# Patient Record
Sex: MALE | Race: BLACK OR AFRICAN AMERICAN | NOT HISPANIC OR LATINO | Employment: FULL TIME | URBAN - METROPOLITAN AREA
[De-identification: names, ages, dates, MRNs, and addresses within clinical notes are randomized per-mention and may not be internally consistent; named-entity substitution may affect disease eponyms.]

---

## 2019-06-03 ENCOUNTER — HOSPITAL ENCOUNTER (EMERGENCY)
Facility: HOSPITAL | Age: 29
Discharge: HOME | End: 2019-06-04
Attending: EMERGENCY MEDICINE
Payer: COMMERCIAL

## 2019-06-03 VITALS
SYSTOLIC BLOOD PRESSURE: 147 MMHG | TEMPERATURE: 99 F | HEART RATE: 71 BPM | HEIGHT: 65 IN | RESPIRATION RATE: 20 BRPM | DIASTOLIC BLOOD PRESSURE: 96 MMHG | WEIGHT: 220 LBS | BODY MASS INDEX: 36.65 KG/M2 | OXYGEN SATURATION: 95 %

## 2019-06-03 DIAGNOSIS — K02.9 DENTAL CARIES: Primary | ICD-10-CM

## 2019-06-03 PROCEDURE — 99284 EMERGENCY DEPT VISIT MOD MDM: CPT

## 2019-06-03 RX ORDER — NAPROXEN 500 MG/1
500 TABLET ORAL 2 TIMES DAILY WITH MEALS
Qty: 30 TABLET | Refills: 0 | Status: SHIPPED | OUTPATIENT
Start: 2019-06-03 | End: 2023-06-13

## 2019-06-03 RX ORDER — CLINDAMYCIN HYDROCHLORIDE 150 MG/1
300 CAPSULE ORAL 4 TIMES DAILY
Qty: 80 CAPSULE | Refills: 0 | Status: SHIPPED | OUTPATIENT
Start: 2019-06-03 | End: 2019-06-13

## 2019-06-04 NOTE — ED PROVIDER NOTES
Encounter Date: 6/3/2019       History     Chief Complaint   Patient presents with    Dental Pain     c/o pain to right upper teeth that spreads to entire face and head x30 minutes. Took ibuprofen 20-30 minutes pta     28-year-old male coming in today complaining of some right upper posterior dental pain.  Current pain is a 7/10.  He states he is trying to sleep tonight when the pain came on.  Did not take any medications prior to arrival here.  States the pain radiates to his right face.  No trouble eating.  No nausea vomiting. No chest pain no shortness of breath.  No fevers no chills. Denies any facial swelling.  States he had been seen by dentist previously and told that he needed to have I a lot of dental work done.  The patient here denies any other acute modifying factors at this time.        Review of patient's allergies indicates:  No Known Allergies  History reviewed. No pertinent past medical history.  History reviewed. No pertinent surgical history.  No family history on file.  Social History     Tobacco Use    Smoking status: Current Every Day Smoker     Types: Cigarettes   Substance Use Topics    Alcohol use: Yes     Comment: occasionally    Drug use: No     Review of Systems   Constitutional:        ----I have reviewed the PMHx, PSHx, FamHX, social hx, allergies and other nursing notes and agree with the nursing notes.         Constitutional: Negative for fever, negative for chills, negative for weight loss    Eyes: Negative for visual changes, negative for eye pain, negative for discharge.    ENMT: Negative for hearing changes, negative for ear pain, negative for ear discharge. negative for neck pain, negative for neck stiffness. negative for epistaxis, negative for rhinorrhea. Mouth:  See HPI    Cardiac: Negative for chest pain, negative for palpitations, negative for lower extremity edema.     Respiratory: Negative for cough, negative for shortness of breath.    GI: Negative for nausea,  negative for vomiting, negative for diarrhea, negative for abdominal pain. negative for rectal bleeding.    : Negative for dysuria, negative for frequency, negative for hematuria.    MS: Negative for muscle weakness, negative for joint pain, negative for back pain.    Neuro: Negative for headache, negative for focal weakness. negative for LOC. Denies any trauma.    Skin: Negative for skin rash.     Endocrine: Negative for polyuria, negative for polydypsia.         Physical Exam     Initial Vitals [06/03/19 2234]   BP Pulse Resp Temp SpO2   (!) 147/96 71 20 98.7 °F (37.1 °C) 95 %      MAP       --         Physical Exam    Constitutional:   Constitutional: A+Ox4, NAD    Eyes: PERRLA, EOMI, Negative for scleral erythema, Negative for eye discharge, Negative for periorbital swelling.    ENT: Tympanic membranes are normal in appearance without erythema bilaterally, Negative for external canal exudate and bleeding, Pharynx: Negative for erythema; no exudate noted; Negative for rhinorrhea. Mouth: There is no floor of the mouth swelling. There is no signs of any acute gum abscess or swelling. There is no tonsillar swelling past the midline. There is no uvula deviation. Submandibular space is soft.  Multiple dental caries      Neck: Negative for neck stiffness, Negative for signs of meningismus.    Heart: RRR without M/G/R    Lungs: CTA-B, Negative for wheezes, stridor, or respiratory distress.    Abdomen: Soft, Bowel sounds x4, Negative for distension, Negative for tenderness in all 4 quadrants, Negative for guarding, Negative for rebound, Negative for rigidity, NOT a surgical acute abdomen.    Back: No costovertebral angle tenderness noted.    Skin: Warm, dry, intact.     MS: Full ROM in all ext.    Psych: Mood appropriate.    Neuro: 5/5 strength in all extremities. 2+ distal pulses and 2+ sensation throughout all extremities. Negative for slurred speech, Negative for facial droop. Negative for any focal neuro deficits.  Gait: Walks with a steady gait.          ----Parts of this note were created using DoYouRemember voice recognition software program. Significant efforts were made to correct any mistakes made by this voice recognition software program however some mistakes, errors, or omissions may remain in the note that were not caught when the note was originally created.         ED Course   Procedures  Labs Reviewed - No data to display       Imaging Results    None          Medical Decision Making:   ED Management:  Patient here is nontoxic well appearing.  Appears to have multiple dental caries.  I will start him on some antibiotics and instructed him to make sure he follows up closely with a dentist.  The patient here otherwise looks well, nontoxic well appearing as result I do feel comfortable discharging him home.  Patient was instructed to return to the ER for any worsening condition or any other emergent concerns                      Clinical Impression:       ICD-10-CM ICD-9-CM   1. Dental caries K02.9 521.00         Disposition:   Disposition: Discharged  Condition: Stable                        Jax Giles MD  06/03/19 6420

## 2019-06-04 NOTE — ED TRIAGE NOTES
Patient comes into ED with upper R dental pain that spread to face x30 minutes. Pt stated he took ibuprofen at home with no relief.

## 2019-09-16 ENCOUNTER — OCCUPATIONAL HEALTH (OUTPATIENT)
Dept: URGENT CARE | Facility: CLINIC | Age: 29
End: 2019-09-16

## 2019-09-16 DIAGNOSIS — Z02.1 PRE-EMPLOYMENT EXAMINATION: Primary | ICD-10-CM

## 2019-09-16 PROCEDURE — 99499 DOT PHYSICAL: ICD-10-PCS | Mod: S$GLB,,, | Performed by: NURSE PRACTITIONER

## 2019-09-16 PROCEDURE — 99499 UNLISTED E&M SERVICE: CPT | Mod: S$GLB,,, | Performed by: NURSE PRACTITIONER

## 2021-09-17 ENCOUNTER — OCCUPATIONAL HEALTH (OUTPATIENT)
Dept: URGENT CARE | Facility: CLINIC | Age: 31
End: 2021-09-17

## 2021-09-17 DIAGNOSIS — Z02.89 ENCOUNTER FOR EXAMINATION REQUIRED BY DEPARTMENT OF TRANSPORTATION (DOT): Primary | ICD-10-CM

## 2021-09-17 PROCEDURE — 99499 PHYSICAL, RECERT DOT/CDL: ICD-10-PCS | Mod: S$GLB,,, | Performed by: NURSE PRACTITIONER

## 2021-09-17 PROCEDURE — 99499 UNLISTED E&M SERVICE: CPT | Mod: S$GLB,,, | Performed by: NURSE PRACTITIONER

## 2023-02-02 ENCOUNTER — OFFICE VISIT (OUTPATIENT)
Dept: PODIATRY | Facility: CLINIC | Age: 33
End: 2023-02-02
Payer: COMMERCIAL

## 2023-02-02 VITALS
HEIGHT: 65 IN | DIASTOLIC BLOOD PRESSURE: 73 MMHG | SYSTOLIC BLOOD PRESSURE: 119 MMHG | BODY MASS INDEX: 36.61 KG/M2 | HEART RATE: 97 BPM

## 2023-02-02 DIAGNOSIS — B07.0 PLANTAR WART: Primary | ICD-10-CM

## 2023-02-02 DIAGNOSIS — L60.9 DISEASE OF NAIL: ICD-10-CM

## 2023-02-02 DIAGNOSIS — L60.0 INGROWN NAIL: ICD-10-CM

## 2023-02-02 PROCEDURE — 3078F PR MOST RECENT DIASTOLIC BLOOD PRESSURE < 80 MM HG: ICD-10-PCS | Mod: CPTII,S$GLB,, | Performed by: STUDENT IN AN ORGANIZED HEALTH CARE EDUCATION/TRAINING PROGRAM

## 2023-02-02 PROCEDURE — 99203 OFFICE O/P NEW LOW 30 MIN: CPT | Mod: S$GLB,,, | Performed by: STUDENT IN AN ORGANIZED HEALTH CARE EDUCATION/TRAINING PROGRAM

## 2023-02-02 PROCEDURE — 99203 PR OFFICE/OUTPT VISIT, NEW, LEVL III, 30-44 MIN: ICD-10-PCS | Mod: S$GLB,,, | Performed by: STUDENT IN AN ORGANIZED HEALTH CARE EDUCATION/TRAINING PROGRAM

## 2023-02-02 PROCEDURE — 1159F PR MEDICATION LIST DOCUMENTED IN MEDICAL RECORD: ICD-10-PCS | Mod: CPTII,S$GLB,, | Performed by: STUDENT IN AN ORGANIZED HEALTH CARE EDUCATION/TRAINING PROGRAM

## 2023-02-02 PROCEDURE — 99999 PR PBB SHADOW E&M-EST. PATIENT-LVL III: CPT | Mod: PBBFAC,,, | Performed by: STUDENT IN AN ORGANIZED HEALTH CARE EDUCATION/TRAINING PROGRAM

## 2023-02-02 PROCEDURE — 1159F MED LIST DOCD IN RCRD: CPT | Mod: CPTII,S$GLB,, | Performed by: STUDENT IN AN ORGANIZED HEALTH CARE EDUCATION/TRAINING PROGRAM

## 2023-02-02 PROCEDURE — 3008F BODY MASS INDEX DOCD: CPT | Mod: CPTII,S$GLB,, | Performed by: STUDENT IN AN ORGANIZED HEALTH CARE EDUCATION/TRAINING PROGRAM

## 2023-02-02 PROCEDURE — 3074F SYST BP LT 130 MM HG: CPT | Mod: CPTII,S$GLB,, | Performed by: STUDENT IN AN ORGANIZED HEALTH CARE EDUCATION/TRAINING PROGRAM

## 2023-02-02 PROCEDURE — 99999 PR PBB SHADOW E&M-EST. PATIENT-LVL III: ICD-10-PCS | Mod: PBBFAC,,, | Performed by: STUDENT IN AN ORGANIZED HEALTH CARE EDUCATION/TRAINING PROGRAM

## 2023-02-02 PROCEDURE — 3078F DIAST BP <80 MM HG: CPT | Mod: CPTII,S$GLB,, | Performed by: STUDENT IN AN ORGANIZED HEALTH CARE EDUCATION/TRAINING PROGRAM

## 2023-02-02 PROCEDURE — 3074F PR MOST RECENT SYSTOLIC BLOOD PRESSURE < 130 MM HG: ICD-10-PCS | Mod: CPTII,S$GLB,, | Performed by: STUDENT IN AN ORGANIZED HEALTH CARE EDUCATION/TRAINING PROGRAM

## 2023-02-02 PROCEDURE — 3008F PR BODY MASS INDEX (BMI) DOCUMENTED: ICD-10-PCS | Mod: CPTII,S$GLB,, | Performed by: STUDENT IN AN ORGANIZED HEALTH CARE EDUCATION/TRAINING PROGRAM

## 2023-02-02 NOTE — PROGRESS NOTES
Subjective:      Patient ID: Yves Yanez is a 32 y.o. male.    Chief Complaint: Foot Problem (Corns/ callouses, or plantar warts) and Toe Pain (Left great toe, painful not all the time, periodically )    Yves is a 32 y.o. male who presents to the podiatry clinic  with complaint of  right foot pain. Onset of the symptoms was several months ago. Precipitating event: none known. Current symptoms include:  pain to bottom of right foot. States he also has some pain to his left great toe. He is also concerned that his toenails are thick . Relates his feet stay wet as he works outside. Aggravating factors: any weight bearing. Symptoms have gradually worsened. Patient has had prior foot problems. Evaluation to date: none. Treatment to date: none. Patients rates pain 6/10 on pain scale.    Review of Systems   Constitutional: Negative for chills, decreased appetite, diaphoresis and fever.   HENT:  Negative for congestion and hearing loss.    Cardiovascular:  Negative for chest pain, claudication, leg swelling and syncope.   Respiratory:  Negative for cough and shortness of breath.    Skin:  Positive for dry skin, nail changes and suspicious lesions. Negative for color change, flushing, itching, poor wound healing and rash.   Musculoskeletal:  Negative for arthritis, back pain, joint pain and joint swelling.   Gastrointestinal:  Negative for nausea and vomiting.   Neurological:  Negative for focal weakness, numbness, paresthesias and weakness.   Psychiatric/Behavioral:  Negative for altered mental status. The patient is not nervous/anxious.          Objective:      Physical Exam  Constitutional:       General: He is not in acute distress.     Appearance: Normal appearance. He is well-developed. He is not diaphoretic.   Cardiovascular:      Comments: Dorsalis pedis and posterior tibial pulses are within normal limits. Skin temperature is within normal limits. Toes are cool to touch and feet are warm proximally. Hair growth is  within normal limits. Skin is normotrophic and without hyperpigmentation. No edema noted. No varicosities or spider veins noted, bilaterally.   Musculoskeletal:      Comments: Adequate joint range of motion without pain, limitation, nor crepitation to foot and ankle joints. Muscle strength is 5/5 in all groups bilaterally.       Feet:      Right foot:      Skin integrity: Dry skin present. No ulcer, blister, erythema or warmth.      Left foot:      Skin integrity: Dry skin present. No ulcer, blister, erythema or warmth.   Skin:     General: Skin is warm and dry.      Capillary Refill: Capillary refill takes less than 2 seconds.      Comments: Skin is warm and dry, no acute signs of infection noted bilaterally      Callus to right plantar foot, upon debridement, site with loss of RSTLs, pin point bleeding and spongy core, consistent with verruca plantaris    Left hallux toenail with distal cryptotic nail border, mild tenderness on palpation, no open wounds or signs of infection. All toenails are thickened by 2-4 mm's, dystrophic, and are darkened in coloration with subungual fungal debris.     Otherwise, no open wounds, macerations or hyperkeratotic lesions, bilaterally            Neurological:      Mental Status: He is alert and oriented to person, place, and time.      Sensory: No sensory deficit.      Motor: No abnormal muscle tone.      Comments: Light touch within normal limits.    Psychiatric:         Mood and Affect: Mood normal.         Behavior: Behavior normal.         Thought Content: Thought content normal.             Assessment:       Encounter Diagnoses   Name Primary?    Plantar wart Yes    Disease of nail     Ingrown nail          Plan:       Yves was seen today for foot problem and toe pain.    Diagnoses and all orders for this visit:    Plantar wart    Disease of nail    Ingrown nail      I counseled the patient on his conditions, their implications and medical management.  Utilizing sterile  toenail clippers I aggressively trimmed  the offending above mentioned  nail border approximately 3 mm from its edge and carried the nail plate incision down at an angle in order to wedge out the offending cryptotic portion of the nail plate. The offending border was then removed in toto. No blood was drawn. Patient tolerated the procedure well and related significant relief. Consider total nail avulsion if needed in future  Using a sterile 15 blade, overlying hyperkeratosis debrided without incident. Discussed etiology and treatment of plantar wart, including OTC treatments with list dispensed. Pt elects to try OTC Compound W, call clinic if signs of infection are noted.   Discussed importance of keeping feet dry and changing socks and shoes on a regular basis to prevent spread of toenail fungus. Discussed treatment options for fungal toenails including topical home remedies, topical over the counter and prescription medications. He elects for OTC treatment for now.  I advised him on keeping nails neatly trimmed, removing all sharp and loose edges, filing the nail as necessary to prevent damage to nail plate and prevent unnecessary pulling of toenail. Also advised to avoid tight fitting shoes to prevent pressure related issues. Recommend shoes with wide toe box or open toed shoe to reduce pressure.   Return to clinic PRN

## 2023-03-03 ENCOUNTER — OFFICE VISIT (OUTPATIENT)
Dept: OPHTHALMOLOGY | Facility: CLINIC | Age: 33
End: 2023-03-03
Payer: COMMERCIAL

## 2023-03-03 DIAGNOSIS — H18.603 KERATOCONUS OF BOTH EYES: Primary | ICD-10-CM

## 2023-03-03 PROCEDURE — 99999 PR PBB SHADOW E&M-EST. PATIENT-LVL I: CPT | Mod: PBBFAC,,, | Performed by: OPHTHALMOLOGY

## 2023-03-03 PROCEDURE — 92025 CPTRIZED CORNEAL TOPOGRAPHY: CPT | Mod: S$GLB,,, | Performed by: OPHTHALMOLOGY

## 2023-03-03 PROCEDURE — 99999 PR PBB SHADOW E&M-EST. PATIENT-LVL I: ICD-10-PCS | Mod: PBBFAC,,, | Performed by: OPHTHALMOLOGY

## 2023-03-03 PROCEDURE — 92025 COMPUTERIZED CORNEAL TOPOGRAPHY: ICD-10-PCS | Mod: S$GLB,,, | Performed by: OPHTHALMOLOGY

## 2023-03-03 PROCEDURE — 99204 OFFICE O/P NEW MOD 45 MIN: CPT | Mod: S$GLB,,, | Performed by: OPHTHALMOLOGY

## 2023-03-03 PROCEDURE — 99204 PR OFFICE/OUTPT VISIT, NEW, LEVL IV, 45-59 MIN: ICD-10-PCS | Mod: S$GLB,,, | Performed by: OPHTHALMOLOGY

## 2023-03-03 RX ORDER — FLUOXETINE HYDROCHLORIDE 20 MG/1
20 CAPSULE ORAL DAILY
COMMUNITY

## 2023-03-06 NOTE — PROGRESS NOTES
HPI    Ref Dr. Mclean    Keratoconus OU - dx'ed 27 yo    Gtts:    Pt is here today for a keratoconus eval. Pt is inquiring about cross   linking. Pt denies eye pain. Pt states the his feels like his vision has   changed and is more blurry. Pt also that's the the left eye becomes blurry   often, feels dry, and irritated. Pt was fitted for new lenses recently and   is waiting on trials.     Last edited by Radha Bustos MD on 3/3/2023  3:46 PM.            Assessment /Plan     For exam results, see Encounter Report.    Keratoconus of both eyes  -     Computerized corneal topography      KERATOCONUS      The diagnosis of corneal ectasia and its etiology and clinical course were discussed. Treatment with the use of specialty contact lenses, collagen cross linking, and corneal transplantation was explained in detail. This patient has never had LASIK.     This patient would be a suitable candidate for Collagen Cross linking (CXL) -- the goal of treatment is to strengthen corneal bonds and prevent further progression / ectasia.  VA improvement - BCVA and VAsc - not guaranteed, however is seen in some cases. Most likely would recommend speciality CL fitting post treatment for BCVA.     This patient exhibits signs of progression of keratoconus and failure of conservative treatments with spectacles and/or contact lenses.      Disease progression is indicated by:  - An increase of >1D in the Kmax  - An increase of >1D of astigmatism in the MRx  - An increase in myopia of >0.50D on MRx     I recommend treatment with Collagen Crosslinking using the Avedro FDA approved epi-off protocol with Photrexa and the KXL System, in order to stabilize this condition.     Plan:   CXL treatment OS, then OD    I have informed the patient that we will seek insurance pre-approval, but in case of failure of the insurance company to cover the cost of this medically necessary procedure, there may be a cost of $5,000 for the patient.    NO  RUBBING EYES!

## 2023-05-16 ENCOUNTER — TELEPHONE (OUTPATIENT)
Dept: OPHTHALMOLOGY | Facility: CLINIC | Age: 33
End: 2023-05-16
Payer: COMMERCIAL

## 2023-05-16 NOTE — TELEPHONE ENCOUNTER
----- Message from Kayley Rowe sent at 5/16/2023  1:26 PM CDT -----    ----- Message -----  From: Zaheer Lacy  Sent: 5/16/2023  12:30 PM CDT  To: Jul[facility] Staff    Dr. Weiss would like to speak with Dr. Bustos about a procedure that is being done. Please call back as soon as you can to further assist. # [phone]

## 2023-05-18 ENCOUNTER — TELEPHONE (OUTPATIENT)
Dept: OPHTHALMOLOGY | Facility: CLINIC | Age: 33
End: 2023-05-18
Payer: COMMERCIAL

## 2023-05-22 DIAGNOSIS — H18.603 KERATOCONUS OF BOTH EYES: Primary | ICD-10-CM

## 2023-05-22 RX ORDER — PREDNISOLONE ACETATE 10 MG/ML
1 SUSPENSION/ DROPS OPHTHALMIC 3 TIMES DAILY
Qty: 5 ML | Refills: 3 | Status: SHIPPED | OUTPATIENT
Start: 2023-05-22 | End: 2024-05-21

## 2023-05-22 RX ORDER — OFLOXACIN 3 MG/ML
1 SOLUTION/ DROPS OPHTHALMIC 3 TIMES DAILY
Qty: 5 ML | Refills: 2 | Status: SHIPPED | OUTPATIENT
Start: 2023-05-22 | End: 2024-01-18

## 2023-05-23 ENCOUNTER — TELEPHONE (OUTPATIENT)
Dept: OPTOMETRY | Facility: CLINIC | Age: 33
End: 2023-05-23
Payer: COMMERCIAL

## 2023-05-23 ENCOUNTER — TELEPHONE (OUTPATIENT)
Dept: OPHTHALMOLOGY | Facility: CLINIC | Age: 33
End: 2023-05-23
Payer: COMMERCIAL

## 2023-05-23 NOTE — TELEPHONE ENCOUNTER
----- Message from Angeli Meadows sent at 5/22/2023  4:45 PM CDT -----  Regarding: FW: ari    ----- Message -----  From: Nicolle Bunch MA  Sent: 5/22/2023   4:40 PM CDT  To: Aleshia Abraham Staff, Hemalatha Paredes Staff  Subject: appt                                             Can someone schedule this pt a Mrx? He's getting a crosslinking procedure on 5/24 and needs glasses because he won't be able to wear his contacts. I'm not sure when he can get a refraction after the procedure.

## 2023-05-23 NOTE — TELEPHONE ENCOUNTER
----- Message from Beena Lamas sent at 5/23/2023  9:48 AM CDT -----  Name of Who is Calling:SEFERINO OLIVARES [[id number]]              What is the request in detail:Requesting a call back to schedule appt to get glasses.               Can the clinic reply by [facility]:              What Number to Call Back if not [facility]:[phone]

## 2023-05-24 ENCOUNTER — CLINICAL SUPPORT (OUTPATIENT)
Dept: OPHTHALMOLOGY | Facility: CLINIC | Age: 33
End: 2023-05-24
Payer: COMMERCIAL

## 2023-05-24 ENCOUNTER — TELEPHONE (OUTPATIENT)
Dept: OPHTHALMOLOGY | Facility: CLINIC | Age: 33
End: 2023-05-24
Payer: COMMERCIAL

## 2023-05-24 DIAGNOSIS — H18.603 KERATOCONUS OF BOTH EYES: Primary | ICD-10-CM

## 2023-05-24 DIAGNOSIS — H57.10 PAIN IN EYE, UNSPECIFIED LATERALITY: Primary | ICD-10-CM

## 2023-05-24 PROCEDURE — 99999 PR PBB SHADOW E&M-EST. PATIENT-LVL II: CPT | Mod: PBBFAC,,,

## 2023-05-24 PROCEDURE — 0402T PR COLLAGEN CROSSLINKING CORNEA: ICD-10-PCS | Mod: LT,S$GLB,, | Performed by: OPHTHALMOLOGY

## 2023-05-24 PROCEDURE — 99499 UNLISTED E&M SERVICE: CPT | Mod: S$GLB,,, | Performed by: OPHTHALMOLOGY

## 2023-05-24 PROCEDURE — 99499 NO LOS: ICD-10-PCS | Mod: S$GLB,,, | Performed by: OPHTHALMOLOGY

## 2023-05-24 PROCEDURE — 99999 PR PBB SHADOW E&M-EST. PATIENT-LVL II: ICD-10-PCS | Mod: PBBFAC,,,

## 2023-05-24 PROCEDURE — 0402T COLGN CRS-LINK CRN&PACHYMTRY: CPT | Mod: LT,S$GLB,, | Performed by: OPHTHALMOLOGY

## 2023-05-24 RX ORDER — OXYCODONE AND ACETAMINOPHEN 5; 325 MG/1; MG/1
1 TABLET ORAL EVERY 6 HOURS PRN
Qty: 5 TABLET | Refills: 0 | Status: SHIPPED | OUTPATIENT
Start: 2023-05-24 | End: 2023-05-27

## 2023-05-26 NOTE — PROGRESS NOTES
HPI    Ref Dr. Mclean     Keratoconus OU - dx'ed 27 yo   Cxl OS - 5/31/23    Patient here today for CXL OS. VA stable ou, no pain and no f/f.    Oflox and Pred tid OS  Last edited by Radha Bustos MD on 5/31/2023  2:15 PM.            Assessment /Plan     For exam results, see Encounter Report.    There are no diagnoses linked to this encounter.  Keratoconus                 SURGEON: Radha Bustos MD     PREOPERATIVE DIAGNOSIS: Keratoconus     POSTOPERATIVE DIAGNOSIS: keratoconus     PROCEDURES: Collagen Crosslinking OS     ANESTHESIA: Topical Tetracaine 0.5%    DROPS ADMINISTERED:  6 ML OF RIBOFLAVIN (TOTAL QUANTITY OF 2)     COMPLICATIONS: None     ESTIMATED BLOOD LOSS: None     SPECIMENS: None     INDICATIONS:  The patient has a history a progressive corneal ectasia. During the initial consultation, a thorough discussion regarding of the risks, benefits, and alternatives to this procedure was undertaken. Risks were listed on the consent form and were reviewed with emphasis on the remote possibility of infection, inflammation, scarring, and progression of ectasia - all of which can potentially lead to loss of vision. Common side effects from the procedure, including pain, dry eye, glare, and haloes, were also explained, as well as defining realistic expectations of stabilizing the disease but not decreasing the need for glasses or contact lenses. The patient voices understanding of these risks and side effects and communicates realistic expectations from the procedure.      DESCRIPTION OF PROCEDURE:  The patient was admitted to the [facility] at [facility] where the operative eye and procedure were verified, vital signs were taken, and pre-operatively 5-10mg of oral diazepam and drops of Zymar and Pred Forte were administered. The patient was brought into the LASER suite and positioned on the bed. A central 9mm epithelial debridement was achieved with the [facility] epithelial scrubber, and the  initial riboflavin drops administered. A 30 minute induction with drops every 2 minutes was followed by pachymetry. When corneal pachy is less than 400um, hypotonic riboflavin drops are used to thicken the cornea to a minimum of 400um. Next, a 30 min UV light treatment, centered on the cornea was delivered. Antibiotics and a bandage contact lens were placed.  The patient was discharged with care instructions and a follow up appointment in the eye clinic.

## 2023-05-31 NOTE — ADDENDUM NOTE
Addended by: JASPAL VERGARA on: 5/31/2023 02:18 PM     Modules accepted: Orders, Level of Service

## 2023-06-02 ENCOUNTER — OFFICE VISIT (OUTPATIENT)
Dept: OPHTHALMOLOGY | Facility: CLINIC | Age: 33
End: 2023-06-02
Payer: COMMERCIAL

## 2023-06-02 DIAGNOSIS — H18.603 KERATOCONUS OF BOTH EYES: Primary | ICD-10-CM

## 2023-06-02 PROCEDURE — 1159F MED LIST DOCD IN RCRD: CPT | Mod: CPTII,S$GLB,, | Performed by: OPHTHALMOLOGY

## 2023-06-02 PROCEDURE — 99999 PR PBB SHADOW E&M-EST. PATIENT-LVL II: CPT | Mod: PBBFAC,,, | Performed by: OPHTHALMOLOGY

## 2023-06-02 PROCEDURE — 99999 PR PBB SHADOW E&M-EST. PATIENT-LVL II: ICD-10-PCS | Mod: PBBFAC,,, | Performed by: OPHTHALMOLOGY

## 2023-06-02 PROCEDURE — 1159F PR MEDICATION LIST DOCUMENTED IN MEDICAL RECORD: ICD-10-PCS | Mod: CPTII,S$GLB,, | Performed by: OPHTHALMOLOGY

## 2023-06-02 PROCEDURE — 99214 PR OFFICE/OUTPT VISIT, EST, LEVL IV, 30-39 MIN: ICD-10-PCS | Mod: S$GLB,,, | Performed by: OPHTHALMOLOGY

## 2023-06-02 PROCEDURE — 99214 OFFICE O/P EST MOD 30 MIN: CPT | Mod: S$GLB,,, | Performed by: OPHTHALMOLOGY

## 2023-06-02 NOTE — PROGRESS NOTES
HPI    Ref Dr. Mclean     Keratoconus OU - dx'ed 27 yo   Cxl OS - 5/31/23    1 week po CXL OS.  He has some discomfort but now gone.  Vision is blurry   OS.    Oflox and Pred TID OS  Last edited by Margie Conte on 6/2/2023  9:16 AM.            Assessment /Plan     For exam results, see Encounter Report.    Keratoconus of both eyes      Doing well, epi healed - okay to d/c BCL and stop abx.    Steroid taper per instruction sheet.    F/up 1 mo - va/IOP OS, mrx if needed.    then okay to schedule CXL OD    KERATOCONUS      The diagnosis of corneal ectasia and its etiology and clinical course were discussed. Treatment with the use of specialty contact lenses, collagen cross linking, and corneal transplantation was explained in detail. This patient has never had LASIK.     This patient would be a suitable candidate for Collagen Cross linking (CXL) -- the goal of treatment is to strengthen corneal bonds and prevent further progression / ectasia.  VA improvement - BCVA and VAsc - not guaranteed, however is seen in some cases. Most likely would recommend speciality CL fitting post treatment for BCVA.     This patient exhibits signs of progression of keratoconus and failure of conservative treatments with spectacles and/or contact lenses.      Disease progression is indicated by:  - An increase of >1D in the Kmax  - An increase of >1D of astigmatism in the MRx  - An increase in myopia of >0.50D on MRx     I recommend treatment with Collagen Crosslinking using the Avedro FDA approved epi-off protocol with Photrexa and the KXL System, in order to stabilize this condition.     Plan:   CXL treatment OD    I have informed the patient that we will seek insurance pre-approval, but in case of failure of the insurance company to cover the cost of this medically necessary procedure, there may be a cost of $5,000 for the patient.

## 2023-06-11 ENCOUNTER — NURSE TRIAGE (OUTPATIENT)
Dept: ADMINISTRATIVE | Facility: CLINIC | Age: 33
End: 2023-06-11
Payer: COMMERCIAL

## 2023-06-11 ENCOUNTER — HOSPITAL ENCOUNTER (EMERGENCY)
Facility: HOSPITAL | Age: 33
Discharge: HOME | End: 2023-06-11
Attending: EMERGENCY MEDICINE
Payer: COMMERCIAL

## 2023-06-11 VITALS
WEIGHT: 215 LBS | DIASTOLIC BLOOD PRESSURE: 68 MMHG | SYSTOLIC BLOOD PRESSURE: 109 MMHG | OXYGEN SATURATION: 96 % | TEMPERATURE: 98 F | RESPIRATION RATE: 16 BRPM | HEART RATE: 70 BPM | BODY MASS INDEX: 35.78 KG/M2

## 2023-06-11 DIAGNOSIS — H18.30 CORNEAL EPITHELIAL DEFECT: Primary | ICD-10-CM

## 2023-06-11 PROCEDURE — 99284 EMERGENCY DEPT VISIT MOD MDM: CPT | Mod: ,,, | Performed by: EMERGENCY MEDICINE

## 2023-06-11 PROCEDURE — 99284 PR EMERGENCY DEPT VISIT,LEVEL IV: ICD-10-PCS | Mod: ,,, | Performed by: EMERGENCY MEDICINE

## 2023-06-11 PROCEDURE — 99284 EMERGENCY DEPT VISIT MOD MDM: CPT

## 2023-06-11 PROCEDURE — 25000003 PHARM REV CODE 250: Performed by: PHYSICIAN ASSISTANT

## 2023-06-11 RX ORDER — VALACYCLOVIR HYDROCHLORIDE 500 MG/1
500 TABLET, FILM COATED ORAL 3 TIMES DAILY
Qty: 21 TABLET | Refills: 0 | Status: SHIPPED | OUTPATIENT
Start: 2023-06-11 | End: 2023-06-18

## 2023-06-11 RX ORDER — ERYTHROMYCIN 5 MG/G
OINTMENT OPHTHALMIC
Qty: 1 G | Refills: 0 | Status: SHIPPED | OUTPATIENT
Start: 2023-06-11 | End: 2024-01-18

## 2023-06-11 RX ORDER — PROPARACAINE HYDROCHLORIDE 5 MG/ML
2 SOLUTION/ DROPS OPHTHALMIC
Status: COMPLETED | OUTPATIENT
Start: 2023-06-11 | End: 2023-06-11

## 2023-06-11 RX ADMIN — FLUORESCEIN SODIUM 1 EACH: 1 STRIP OPHTHALMIC at 11:06

## 2023-06-11 RX ADMIN — PROPARACAINE HYDROCHLORIDE 2 DROP: 5 SOLUTION/ DROPS OPHTHALMIC at 11:06

## 2023-06-11 NOTE — DISCHARGE INSTRUCTIONS
Take Valtrex 500 mg three times daily x 10 days. Re-start home ocuflox three times daily. Start erythromycin ointment nightly. Stop taking Pred Forte.   Follow-up with Ophthalmology or return to the emergency department sooner for any new or worsening symptoms.

## 2023-06-11 NOTE — TELEPHONE ENCOUNTER
L eye irritation, red, discharge since this morning. Severe pain.     Dispo-ER now for eval. Pt vu.     Reason for Disposition   SEVERE eye pain (e.g., excruciating)    Protocols used: Eye - Pus or [id number]

## 2023-06-11 NOTE — CONSULTS
Consultation Report  Ophthalmology Service    Date: 06/11/2023    Chief complaint/Reason for Consult: left eye watering and pain      History of Present Illness: Yves Yanez is a 32 y.o. male with POcularHx keratoconus OU and CXL OS 5/31/23 who presents with left eye watering, blurry vision, and photophobia starting last night. Patient denies any recent trauma to eyes but says he may have rubbed his eyes when working outside. No issues with right eye.    POcularHx:   Keratoconus OU - dx'ed 29 yo   Cxl OS - 5/31/23     Current eye gtts: Pred forte BID     Family Hx: Denies family history of glaucoma, macular degeneration, or blindness. family history is not on file.     PMHx:  has no past medical history on file.     PSurgHx:  has no past surgical history on file.     Home Medications:   Prior to Admission medications    Medication Sig Start Date End Date Taking? Authorizing Provider   ofloxacin (OCUFLOX) 0.3 % ophthalmic solution Place 1 drop into the left eye 3 (three) times daily. 5/22/23  Yes Radha Bustos MD   prednisoLONE acetate (PRED FORTE) 1 % DrpS Place 1 drop into the left eye 3 (three) times daily. 5/22/23 5/21/24 Yes Radha Bustos MD   FLUoxetine 20 MG capsule Take 20 mg by mouth once daily.    Historical Provider   naproxen (NAPROSYN) 500 MG tablet Take 1 tablet (500 mg total) by mouth 2 (two) times daily with meals. As needed for pain 6/3/19   Jax Giles MD        Medications this encounter:    fluorescein  1 strip Left Eye ED 1 Time    proparacaine  2 drop Left Eye ED 1 Time       Allergies: has No Known Allergies.     Social:  reports that he has been smoking cigarettes and vaping with nicotine. He has never used smokeless tobacco. He reports current alcohol use. He reports that he does not use drugs.     ROS: As per HPI    Ocular examination/Dilated fundus examination:  Base Eye Exam       Visual Acuity (Snellen - Linear)         Right Left    Dist sc  CF @ 5 ft    Dist cc 20/20      Dist ph sc  20/100   Patient wearing scleral IOL OD, not wearing scleral IOL OS             Tonometry (Tonopen, 10:40 AM)         Right Left    Pressure STP 7   Tonopen deferred OD due to scleral CL             Pupils         Pupils    Right PERRL    Left PERRL              Neuro/Psych       Oriented x3: Yes    Mood/Affect: Normal                  Slit Lamp and Fundus Exam       External Exam         Right Left    External Normal Normal              Slit Lamp Exam         Right Left    Lids/Lashes Normal Normal, no herpetic lesions visualized    Conjunctiva/Sclera White and quiet White and quiet    Cornea KCN, scleral IOL in place, clear KCN, 2 mm dendriform appearing epithelial defect centrally, otherwise clear    Anterior Chamber Deep and quiet Deep and quiet    Iris Round and reactive Round and reactive    Lens Clear Clear    Anterior Vitreous Normal Normal              Fundus Exam         Right Left    Disc  Normal    C/D Ratio  0.3    Macula  Normal    Vessels  Normal    Periphery  Normal    DFE deferred by patient OD                      Assessment/Plan:     1. Epithelial defect, left eye   - Patient with recent history of CXL 5/31/23 with Dr. Bustos. Doing well but started experiencing left eye watering, photophobia, and pain yesterday   - Patient with 2 mm dendriform appearing epi defect centrally, cornea otherwise clear  - Given dendriform appearance of epi defect, recommend starting Valtrex 500 mg TID x 10 days   - Re-start home ocuflox TID OS  - Start erythromycin yuan QHS OS  - Stop Pred Forte for now     Will arrange outpatient follow up for patient this week.   Patient's Best Contact Number: [phone]    Lupe Cabrera MD PGY-2  [employer] Ophthalmology Resident  06/11/2023  10:47 AM

## 2023-06-11 NOTE — ED PROVIDER NOTES
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  He does report working outside yesterday, but denies direct trauma or injury to the eye.  No foreign body sensation.  He is using his eyedrops as prescribed by his ophthalmologist.  He endorses continued blurred vision since his surgery.  No new changes to his vision.  He denies other worsening or alleviating factors.    Review of patient's allergies indicates:  No Known Allergies  History reviewed. No pertinent past medical history.  History reviewed. No pertinent surgical history.  History reviewed. No pertinent family history.  Social History     Tobacco Use    Smoking status: Every Day     Types: Cigarettes, Vaping with nicotine    Smokeless tobacco: Never   Substance Use Topics    Alcohol use: Yes     Comment: occasionally    Drug use: No     Review of Systems   Eyes:  Positive for photophobia, pain and redness.     Physical Exam     Initial Vitals [06/11/23 0919]   BP Pulse Resp Temp SpO2   129/72 85 20 98 °F (36.7 °C) 98 %      MAP       --         Physical Exam    Nursing note and vitals reviewed.  Constitutional: He appears well-developed and well-nourished. He is not diaphoretic. No distress.   HENT:   Head: Normocephalic and atraumatic.   Mouth/Throat: Oropharynx is clear and moist.   Eyes: EOM are normal. Pupils are equal, round, and reactive to light. Left conjunctiva is injected.   3 mm fluorescein uptake centrally   No large abrasion or ulceration   IOP 17    Neck: Neck supple.   Normal range of motion.  Cardiovascular:  Normal rate, regular rhythm, normal heart sounds and intact distal pulses.      Exam reveals no gallop and no friction rub.       No murmur heard.  Pulmonary/Chest: Breath sounds normal. He has no wheezes. He has no rhonchi. He has no rales.   Abdominal: Abdomen is soft. Bowel sounds are normal. There is no abdominal tenderness.   Musculoskeletal:         General: Normal range of motion.      Cervical back: Normal range of motion and neck supple.     Neurological: He is alert and oriented to person, place, and time. He has normal strength. GCS score is 15. GCS eye subscore is 4. GCS verbal subscore is 5. GCS motor subscore is 6.   Skin: Skin is warm and dry. Capillary refill takes less than 2 seconds.   Psychiatric: He has a normal mood and affect. His behavior is normal. Judgment and thought content normal.       ED Course   Procedures  Labs Reviewed - No data to display       Imaging Results    None          Medications   fluorescein ophthalmic strip 1 each (1 each Left Eye Given 6/11/23 1110)   proparacaine 0.5 % ophthalmic solution 2 drop (2 drops Left Eye Given 6/11/23 1109)     Medical Decision Making:   History:   Old Medical Records: I decided to obtain old medical records.  Initial Assessment:   Emergent evaluation of a 32 y.o. male presenting to the emergency department complaining of left eye pain and tearing. Patient is afebrile, hemodynamically stable, and non toxic appearing.  Will perform eye examined consult Ophthalmology.    Differential Diagnosis:   Differential diagnosis includes but isn't limited to conjunctivitis, uveitis, keratitis.  ED Management:  Patient presenting with left eye pain and tearing.  He is status post CXL 5/31 with Ophthalmology.  He has been compliant with his eyedrops.  Ophthalmology has evaluated the patient.  See their consult note for full detail.  He has a small amount of uptake centrally, which is concerning for possible shingles.  He will be started on Valtrex.  I will also give him erythromycin ointment.  He will follow up with Ophthalmology  Clinic next week.  Return precautions.  All questions answered.  The patient was instructed to follow up with ophtho or to return to the emergency department for worsening symptoms. The treatment plan was discussed with the patient who demonstrated understanding and comfort with plan.                        Clinical Impression:   Final diagnoses:  [H18.30] Corneal epithelial defect (Primary)        ED Disposition Condition    Discharge Stable          ED Prescriptions       Medication Sig Dispense Start Date End Date Auth. Provider    valACYclovir (VALTREX) 500 MG tablet Take 1 tablet (500 mg total) by mouth 3 (three) times daily. for 7 days 21 tablet 6/11/2023 6/18/2023 Rosie Castrejon PA-C    erythromycin (ROMYCIN) ophthalmic ointment Place a 1/2 inch ribbon of ointment into the lower eyelid. 1 g 6/11/2023 -- Rosie Castrejon PA-C          Follow-up Information       Follow up With Specialties Details Why Contact Info Additional Information    [facility] Emergency Dept Emergency Medicine Go to  If symptoms worsen [address] [facility] [id number] [phone]     [facility] - [address] [place] [address] Ophthalmology Schedule an appointment as soon as possible for a visit   [address] [facility] [id number] [phone] Please arrive on the 10th floor for check-in.             Rosie Castrejon PA-C  06/11/23 8003

## 2023-06-11 NOTE — ED TRIAGE NOTES
Yves Yanez, an 32 y.o. male presents to the ED complaining of left eye redness. Recent eye surgery 3 weeks prior. Friday night he used lubricating gel eye drops that he had at home and on Saturday night he started having photophobia and eye pain. Upon waking, the left eye pain was worse and had drainage. Denies all complaints. Endorses blurry vision intermittently to the l eye.       LOC: The patient is awake, alert and aware of environment with an appropriate affect, the patient is oriented x 3 and speaking appropriately.   APPEARANCE: Patient appears comfortable and in no acute distress, patient is clean and well groomed.  SKIN: The skin is warm and dry, color consistent with ethnicity.   MUSCULOSKELETAL: Patient moving all extremities spontaneously, no swelling noted.  RESPIRATORY: Airway is open and patent, respirations are spontaneous, patient has a normal effort and rate, no accessory muscle use noted.  CARDIAC: Patient has a normal rate and regular rhythm, no edema noted, capillary refill < 3 seconds.   GASTRO: Soft and non tender to palpation, no distention noted.   : Pt denies any pain or frequency with urination.  NEURO: Pt opens eyes spontaneously, behavior appropriate to situation, follows commands.  HEENT: left eye redness, drainage, and photophobia.       Chief Complaint   Patient presents with    Eye Drainage     Had surgery on left eye about 3 weeks ago and has been off antibiotic eye drops x1 week and noticed drainage and redness to left eye this am when he woke up     Review of patient's allergies indicates:  No Known Allergies  No past medical history on file.

## 2023-06-12 ENCOUNTER — TELEPHONE (OUTPATIENT)
Dept: OPHTHALMOLOGY | Facility: CLINIC | Age: 33
End: 2023-06-12
Payer: COMMERCIAL

## 2023-06-12 RX ORDER — VALACYCLOVIR HYDROCHLORIDE 500 MG/1
500 TABLET, FILM COATED ORAL 3 TIMES DAILY
Qty: 30 TABLET | Refills: 0 | Status: SHIPPED | OUTPATIENT
Start: 2023-06-12 | End: 2023-06-22

## 2023-06-12 RX ORDER — OFLOXACIN 3 MG/ML
1 SOLUTION/ DROPS OPHTHALMIC 3 TIMES DAILY
Qty: 5 ML | Refills: 0 | Status: SHIPPED | OUTPATIENT
Start: 2023-06-12 | End: 2023-07-12

## 2023-06-12 RX ORDER — ERYTHROMYCIN 5 MG/G
0.5 OINTMENT OPHTHALMIC NIGHTLY
Qty: 15 G | Refills: 0 | Status: SHIPPED | OUTPATIENT
Start: 2023-06-12 | End: 2023-07-12

## 2023-06-12 NOTE — TELEPHONE ENCOUNTER
----- Message from Lupe Cabrera MD sent at 6/12/2023  8:45 AM CDT -----  Regarding: Ed follow up  Can we get this patient scheduled for urgent clinic with Dr. Padgett at [facility] tomorrow 6/13? Patient can be reached at [phone].     Thanks!   Lupe Cabrera

## 2023-06-12 NOTE — TELEPHONE ENCOUNTER
----- Message from Lupe Cabrera MD sent at 6/12/2023  8:45 AM CDT -----  Regarding: Ed follow up  Can we get this patient scheduled for urgent clinic with Dr. Padgett at [facility] tomorrow 6/13? Patient can be reached at [phone].     Thanks!   Lupe Cabrera

## 2023-06-13 ENCOUNTER — OFFICE VISIT (OUTPATIENT)
Dept: OPHTHALMOLOGY | Facility: CLINIC | Age: 33
End: 2023-06-13
Payer: COMMERCIAL

## 2023-06-13 DIAGNOSIS — H18.602 KERATOCONUS OF LEFT EYE: ICD-10-CM

## 2023-06-13 DIAGNOSIS — S05.02XA CORNEAL ABRASION, LEFT, INITIAL ENCOUNTER: Primary | ICD-10-CM

## 2023-06-13 PROCEDURE — 99999 PR PBB SHADOW E&M-EST. PATIENT-LVL II: CPT | Mod: PBBFAC,,, | Performed by: OPHTHALMOLOGY

## 2023-06-13 PROCEDURE — 1159F MED LIST DOCD IN RCRD: CPT | Mod: CPTII,S$GLB,, | Performed by: OPHTHALMOLOGY

## 2023-06-13 PROCEDURE — 99213 OFFICE O/P EST LOW 20 MIN: CPT | Mod: S$GLB,,, | Performed by: OPHTHALMOLOGY

## 2023-06-13 PROCEDURE — 99999 PR PBB SHADOW E&M-EST. PATIENT-LVL II: ICD-10-PCS | Mod: PBBFAC,,, | Performed by: OPHTHALMOLOGY

## 2023-06-13 PROCEDURE — 99213 PR OFFICE/OUTPT VISIT, EST, LEVL III, 20-29 MIN: ICD-10-PCS | Mod: S$GLB,,, | Performed by: OPHTHALMOLOGY

## 2023-06-13 PROCEDURE — 1160F RVW MEDS BY RX/DR IN RCRD: CPT | Mod: CPTII,S$GLB,, | Performed by: OPHTHALMOLOGY

## 2023-06-13 PROCEDURE — 1160F PR REVIEW ALL MEDS BY PRESCRIBER/CLIN PHARMACIST DOCUMENTED: ICD-10-PCS | Mod: CPTII,S$GLB,, | Performed by: OPHTHALMOLOGY

## 2023-06-13 PROCEDURE — 1159F PR MEDICATION LIST DOCUMENTED IN MEDICAL RECORD: ICD-10-PCS | Mod: CPTII,S$GLB,, | Performed by: OPHTHALMOLOGY

## 2023-06-13 RX ORDER — PHENTERMINE HYDROCHLORIDE 37.5 MG/1
18.75 TABLET ORAL 2 TIMES DAILY
COMMUNITY
Start: 2023-06-02

## 2023-06-13 NOTE — PROGRESS NOTES
HPI    Triage pt  Patient here for follow up Corneal epithelial defect OS.  Pt states OS some improvements, but still painful.  6 on pain scale.    I have personally interviewed the patient, reviewed the history and   examined the patient and agree with the technician's exam.   Eye drops:Ocuflox TID OS                   Erythromycin yuan QHS OS    I have personally interviewed the patient, reviewed the history and   examined the patient and agree with the technician's &/or resident's exam,   assessment and plan.     Last edited by Jax Padgett MD on 6/13/2023 12:33 PM.            Assessment /Plan     For exam results, see Encounter Report.    Corneal abrasion, left, initial encounter    Keratoconus of left eye      Epi defect, left eye (resolved)  KCN, both eyes   - Patient with recent history of CXL 5/31/23 with Dr. Bustos.   - Presented to the ED 6/11 with dendriform-appearing corneal abrasion. Started on Valtrex, ocuflox, and erythromycin yuan. PF stopped  - Today patient with resolved epi defect   - Discussed with Dr. Bustos, ok to stop Valtrex. Continue ocuflox TID and resume PF taper     Will arrange follow up with Dr. Bustos in 2-3 weeks for K check OS and possible planning for CXL OD

## 2023-06-13 NOTE — PATIENT INSTRUCTIONS
Epi defect, left eye (resolved)  KCN, both eyes   - Patient with recent history of CXL 5/31/23 with Dr. Bustos.   - Presented to the ED 6/11 with dendriform-appearing corneal abrasion. Started on Valtrex, ocuflox, and erythromycin yuan. PF stopped  - Today patient with resolved epi defect   - Discussed with Dr. Bustos, ok to stop Valtrex. Continue ocuflox TID and resume PF taper      Will arrange follow up with Dr. Bustos in 2-3 weeks for K check OS and possible planning for CXL OD

## 2023-06-16 ENCOUNTER — OFFICE VISIT (OUTPATIENT)
Dept: OPTOMETRY | Facility: CLINIC | Age: 33
End: 2023-06-16
Payer: COMMERCIAL

## 2023-06-16 DIAGNOSIS — S05.02XD CORNEAL ABRASION, LEFT, SUBSEQUENT ENCOUNTER: Primary | ICD-10-CM

## 2023-06-16 PROCEDURE — 1159F MED LIST DOCD IN RCRD: CPT | Mod: CPTII,S$GLB,, | Performed by: OPTOMETRIST

## 2023-06-16 PROCEDURE — 1159F PR MEDICATION LIST DOCUMENTED IN MEDICAL RECORD: ICD-10-PCS | Mod: CPTII,S$GLB,, | Performed by: OPTOMETRIST

## 2023-06-16 PROCEDURE — 99999 PR PBB SHADOW E&M-EST. PATIENT-LVL III: ICD-10-PCS | Mod: PBBFAC,,, | Performed by: OPTOMETRIST

## 2023-06-16 PROCEDURE — 92012 PR EYE EXAM, EST PATIENT,INTERMED: ICD-10-PCS | Mod: S$GLB,,, | Performed by: OPTOMETRIST

## 2023-06-16 PROCEDURE — 92012 INTRM OPH EXAM EST PATIENT: CPT | Mod: S$GLB,,, | Performed by: OPTOMETRIST

## 2023-06-16 PROCEDURE — 99999 PR PBB SHADOW E&M-EST. PATIENT-LVL III: CPT | Mod: PBBFAC,,, | Performed by: OPTOMETRIST

## 2023-06-20 NOTE — PROGRESS NOTES
HPI    DLS :06/13/2023 Dr. Padgett (ER referral)  32 y.o. male is here for corneal abrasion, left eye. Pt states that he   feel a FB sensation, left eye. Like lashes are poking him in the left eye.   Left eye itch, burn and tear. Photophobia, left eye. Blurred vision, left   eye. Pt has cross linking done 5/24/2023, left eye.   Eye Med's: Ofloxacin BID- TID OS                     Pred qd OS  Last edited by Hemalatha Paredes, OD on 6/20/2023 11:45 AM.            Assessment /Plan     For exam results, see Encounter Report.    Corneal abrasion, left, subsequent encounter      Educated pt on condition resolved with residual dryness causing FB sensation. Pt sees Dr. Weiss for his scleral fitting. Pt is OK to resume scleral fit OS so that he has a wearable lens/vision correction option to wear while OD goes through CXL. Reached out to Dr. Weiss to let her know pt is ready to resume fit OS.    RTC x 2 weeks for pre-op with Dr. Bustos

## 2023-07-19 ENCOUNTER — PATIENT MESSAGE (OUTPATIENT)
Dept: RESEARCH | Facility: HOSPITAL | Age: 33
End: 2023-07-19
Payer: COMMERCIAL

## 2023-07-25 ENCOUNTER — OFFICE VISIT (OUTPATIENT)
Dept: FAMILY MEDICINE | Facility: CLINIC | Age: 33
End: 2023-07-25
Payer: COMMERCIAL

## 2023-07-25 DIAGNOSIS — K92.1 BLOOD IN STOOL: ICD-10-CM

## 2023-07-25 DIAGNOSIS — R06.83 SNORING: ICD-10-CM

## 2023-07-25 DIAGNOSIS — F41.9 ANXIETY: ICD-10-CM

## 2023-07-25 DIAGNOSIS — Z00.00 ROUTINE GENERAL MEDICAL EXAMINATION AT A HEALTH CARE FACILITY: Primary | ICD-10-CM

## 2023-07-25 PROCEDURE — 99385 PREV VISIT NEW AGE 18-39: CPT | Mod: 95,,, | Performed by: FAMILY MEDICINE

## 2023-07-25 PROCEDURE — 99385 PR PREVENTIVE VISIT,NEW,18-39: ICD-10-PCS | Mod: 95,,, | Performed by: FAMILY MEDICINE

## 2023-07-25 NOTE — PROGRESS NOTES
The patient location is: [place]  The chief complaint leading to consultation is: routine labs, sleep apnea    Visit type: audiovisual    Notes:    Subjective:    HPI: 32 y.o. male with non significant medical hx on file presented today for virtual visit with concerns about sleep apnea and blood in stools.  He is new to me, has no primary care doctor.  Patient reports that he snores at night and family members have witnessed apnea spells, is interested to have sleep study done.  His other concern is having blood in stool, it is intermittent problem, was told in the past at urgent care visit which that he might have hemorrhoids, most recent episode was yesterday, denies having any pain with bowel movement, reports having constipation with straining which he is trying to manage with diet modification. He also wants to do basic labs. He used to take fluoxetine for anxiety, has ongoing symptoms and wants to follow up with psychiatrist. He denies any other symptoms or concerns.       Review of Systems:   Negative unless otherwise noted positive-  Gen- Weakness/ Fatigue  Neuro- Confusion  CV- Chest Pain/ Palpitations  Resp: Cough/ SOB  GI- Nausea/Vomiting  Extrem- Pain/Swelling      Objective:  No flowsheet data found.      Physical Exam:  General- Patient alert and oriented x3 in NAD  HEENT- PERRLA, EOMI, OP clear  Resp- No increased WOB noted. Not using accessory muscles.  GI- Non tender/non-distended  Extrem- No cyanosis, clubbing, edema.   Skin-  No Jaundice. No visible skin lesions.      Plan:      1. Routine general medical examination at a health care facility    - CBC Auto Differential; Future  - Comprehensive Metabolic Panel; Future  - TSH; Future  - Lipid Panel; Future  - Hemoglobin A1C; Future      2. Anxiety    - Ambulatory referral/consult to Psychiatry; Future      3. Snoring    - Ambulatory referral/consult to Sleep Disorders; Future      4. Blood in stool    - CBC Auto Differential; Future  - Occult  blood x 1, stool; Future     Advised to add fibre in diet, maintain enough hydration      Face to Face time with patient: 15 mins      30 minutes of total time spent on the encounter, which includes face to face time and non-face to face time preparing to see the patient (eg, review of tests), Obtaining and/or reviewing separately obtained history, Documenting clinical information in the electronic or other health record, Independently interpreting results (not separately reported) and communicating results to the patient/family/caregiver, or Care coordination (not separately reported).     Each patient to whom he or she provides medical services by telemedicine is:  (1) informed of the relationship between the physician and patient and the respective role of any other health care provider with respect to management of the patient; and (2) notified that he or she may decline to receive medical services by telemedicine and may withdraw from such care at any time.       Starr Guardado   7/25/2023        Answers submitted by the patient for this visit:  Review of Systems Questionnaire (Submitted on 7/25/2023)  activity change: No  unexpected weight change: No  neck pain: No  hearing loss: No  rhinorrhea: No  trouble swallowing: No  eye discharge: No  visual disturbance: No  chest tightness: No  wheezing: No  chest pain: No  palpitations: No  blood in stool: Yes  constipation: Yes  vomiting: No  diarrhea: Yes  polydipsia: No  polyuria: No  difficulty urinating: No  urgency: No  hematuria: No  joint swelling: No  arthralgias: No  headaches: No  weakness: No  confusion: No  dysphoric mood: Yes      Advised to establish acre with PCP

## 2023-07-26 ENCOUNTER — LAB VISIT (OUTPATIENT)
Dept: LAB | Facility: HOSPITAL | Age: 33
End: 2023-07-26
Attending: FAMILY MEDICINE
Payer: COMMERCIAL

## 2023-07-26 DIAGNOSIS — Z00.00 ROUTINE GENERAL MEDICAL EXAMINATION AT A HEALTH CARE FACILITY: ICD-10-CM

## 2023-07-26 LAB
ALBUMIN SERPL BCP-MCNC: 3.8 G/DL (ref 3.5–5.2)
ALP SERPL-CCNC: 78 U/L (ref 55–135)
ALT SERPL W/O P-5'-P-CCNC: 26 U/L (ref 10–44)
ANION GAP SERPL CALC-SCNC: 12 MMOL/L (ref 8–16)
AST SERPL-CCNC: 24 U/L (ref 10–40)
BASOPHILS # BLD AUTO: 0.05 K/UL (ref 0–0.2)
BASOPHILS NFR BLD: 0.6 % (ref 0–1.9)
BILIRUB SERPL-MCNC: 0.2 MG/DL (ref 0.1–1)
BUN SERPL-MCNC: 11 MG/DL (ref 6–20)
CALCIUM SERPL-MCNC: 9.4 MG/DL (ref 8.7–10.5)
CHLORIDE SERPL-SCNC: 106 MMOL/L (ref 95–110)
CHOLEST SERPL-MCNC: 184 MG/DL (ref 120–199)
CHOLEST/HDLC SERPL: 4.1 {RATIO} (ref 2–5)
CO2 SERPL-SCNC: 22 MMOL/L (ref 23–29)
CREAT SERPL-MCNC: 1.1 MG/DL (ref 0.5–1.4)
DIFFERENTIAL METHOD: ABNORMAL
EOSINOPHIL # BLD AUTO: 0.7 K/UL (ref 0–0.5)
EOSINOPHIL NFR BLD: 7.8 % (ref 0–8)
ERYTHROCYTE [DISTWIDTH] IN BLOOD BY AUTOMATED COUNT: 14.6 % (ref 11.5–14.5)
EST. GFR  (NO RACE VARIABLE): >60 ML/MIN/1.73 M^2
ESTIMATED AVG GLUCOSE: 120 MG/DL (ref 68–131)
GLUCOSE SERPL-MCNC: 105 MG/DL (ref 70–110)
HBA1C MFR BLD: 5.8 % (ref 4–5.6)
HCT VFR BLD AUTO: 41.9 % (ref 40–54)
HDLC SERPL-MCNC: 45 MG/DL (ref 40–75)
HDLC SERPL: 24.5 % (ref 20–50)
HGB BLD-MCNC: 13.4 G/DL (ref 14–18)
IMM GRANULOCYTES # BLD AUTO: 0.04 K/UL (ref 0–0.04)
IMM GRANULOCYTES NFR BLD AUTO: 0.5 % (ref 0–0.5)
LDLC SERPL CALC-MCNC: 86.2 MG/DL (ref 63–159)
LYMPHOCYTES # BLD AUTO: 2.7 K/UL (ref 1–4.8)
LYMPHOCYTES NFR BLD: 31.5 % (ref 18–48)
MCH RBC QN AUTO: 25.4 PG (ref 27–31)
MCHC RBC AUTO-ENTMCNC: 32 G/DL (ref 32–36)
MCV RBC AUTO: 79 FL (ref 82–98)
MONOCYTES # BLD AUTO: 0.7 K/UL (ref 0.3–1)
MONOCYTES NFR BLD: 8.3 % (ref 4–15)
NEUTROPHILS # BLD AUTO: 4.5 K/UL (ref 1.8–7.7)
NEUTROPHILS NFR BLD: 51.3 % (ref 38–73)
NONHDLC SERPL-MCNC: 139 MG/DL
NRBC BLD-RTO: 0 /100 WBC
PLATELET # BLD AUTO: 228 K/UL (ref 150–450)
PMV BLD AUTO: 11.3 FL (ref 9.2–12.9)
POTASSIUM SERPL-SCNC: 3.8 MMOL/L (ref 3.5–5.1)
PROT SERPL-MCNC: 7 G/DL (ref 6–8.4)
RBC # BLD AUTO: 5.28 M/UL (ref 4.6–6.2)
SODIUM SERPL-SCNC: 140 MMOL/L (ref 136–145)
TRIGL SERPL-MCNC: 264 MG/DL (ref 30–150)
TSH SERPL DL<=0.005 MIU/L-ACNC: 1.71 UIU/ML (ref 0.4–4)
WBC # BLD AUTO: 8.69 K/UL (ref 3.9–12.7)

## 2023-07-26 PROCEDURE — 84443 ASSAY THYROID STIM HORMONE: CPT | Performed by: FAMILY MEDICINE

## 2023-07-26 PROCEDURE — 36415 COLL VENOUS BLD VENIPUNCTURE: CPT | Performed by: FAMILY MEDICINE

## 2023-07-26 PROCEDURE — 83036 HEMOGLOBIN GLYCOSYLATED A1C: CPT | Performed by: FAMILY MEDICINE

## 2023-07-26 PROCEDURE — 85025 COMPLETE CBC W/AUTO DIFF WBC: CPT | Performed by: FAMILY MEDICINE

## 2023-07-26 PROCEDURE — 80053 COMPREHEN METABOLIC PANEL: CPT | Performed by: FAMILY MEDICINE

## 2023-07-26 PROCEDURE — 80061 LIPID PANEL: CPT | Performed by: FAMILY MEDICINE

## 2023-07-27 ENCOUNTER — OFFICE VISIT (OUTPATIENT)
Dept: SLEEP MEDICINE | Facility: CLINIC | Age: 33
End: 2023-07-27
Payer: COMMERCIAL

## 2023-07-27 VITALS
HEART RATE: 98 BPM | HEIGHT: 65 IN | DIASTOLIC BLOOD PRESSURE: 72 MMHG | SYSTOLIC BLOOD PRESSURE: 131 MMHG | OXYGEN SATURATION: 97 % | BODY MASS INDEX: 38.32 KG/M2 | WEIGHT: 230 LBS

## 2023-07-27 DIAGNOSIS — F41.1 ANXIETY STATE: ICD-10-CM

## 2023-07-27 DIAGNOSIS — H18.603 KERATOCONUS OF BOTH EYES: ICD-10-CM

## 2023-07-27 DIAGNOSIS — G47.30 SLEEP APNEA, UNSPECIFIED TYPE: Primary | ICD-10-CM

## 2023-07-27 DIAGNOSIS — R06.83 SNORING: ICD-10-CM

## 2023-07-27 PROCEDURE — 99999 PR PBB SHADOW E&M-EST. PATIENT-LVL III: ICD-10-PCS | Mod: PBBFAC,,, | Performed by: NURSE PRACTITIONER

## 2023-07-27 PROCEDURE — 3078F PR MOST RECENT DIASTOLIC BLOOD PRESSURE < 80 MM HG: ICD-10-PCS | Mod: CPTII,S$GLB,, | Performed by: NURSE PRACTITIONER

## 2023-07-27 PROCEDURE — 3044F PR MOST RECENT HEMOGLOBIN A1C LEVEL <7.0%: ICD-10-PCS | Mod: CPTII,S$GLB,, | Performed by: NURSE PRACTITIONER

## 2023-07-27 PROCEDURE — 3044F HG A1C LEVEL LT 7.0%: CPT | Mod: CPTII,S$GLB,, | Performed by: NURSE PRACTITIONER

## 2023-07-27 PROCEDURE — 3008F BODY MASS INDEX DOCD: CPT | Mod: CPTII,S$GLB,, | Performed by: NURSE PRACTITIONER

## 2023-07-27 PROCEDURE — 3075F SYST BP GE 130 - 139MM HG: CPT | Mod: CPTII,S$GLB,, | Performed by: NURSE PRACTITIONER

## 2023-07-27 PROCEDURE — 1159F PR MEDICATION LIST DOCUMENTED IN MEDICAL RECORD: ICD-10-PCS | Mod: CPTII,S$GLB,, | Performed by: NURSE PRACTITIONER

## 2023-07-27 PROCEDURE — 3075F PR MOST RECENT SYSTOLIC BLOOD PRESS GE 130-139MM HG: ICD-10-PCS | Mod: CPTII,S$GLB,, | Performed by: NURSE PRACTITIONER

## 2023-07-27 PROCEDURE — 1159F MED LIST DOCD IN RCRD: CPT | Mod: CPTII,S$GLB,, | Performed by: NURSE PRACTITIONER

## 2023-07-27 PROCEDURE — 3008F PR BODY MASS INDEX (BMI) DOCUMENTED: ICD-10-PCS | Mod: CPTII,S$GLB,, | Performed by: NURSE PRACTITIONER

## 2023-07-27 PROCEDURE — 99203 OFFICE O/P NEW LOW 30 MIN: CPT | Mod: S$GLB,,, | Performed by: NURSE PRACTITIONER

## 2023-07-27 PROCEDURE — 3078F DIAST BP <80 MM HG: CPT | Mod: CPTII,S$GLB,, | Performed by: NURSE PRACTITIONER

## 2023-07-27 PROCEDURE — 99999 PR PBB SHADOW E&M-EST. PATIENT-LVL III: CPT | Mod: PBBFAC,,, | Performed by: NURSE PRACTITIONER

## 2023-07-27 PROCEDURE — 99203 PR OFFICE/OUTPT VISIT, NEW, LEVL III, 30-44 MIN: ICD-10-PCS | Mod: S$GLB,,, | Performed by: NURSE PRACTITIONER

## 2023-07-27 NOTE — PROGRESS NOTES
"Referred by Dr. Guardado    CHIEF COMPLAINT: Sleep evaluation    HISTORY OF PRESENT ILLNESS: He has never had a sleep study. +snores loudly and girlfriend endorses witnessed apneic pauses. He may awaken choking. Does not awaken rested. Not getting deep sleep. AM headaches 5-10d/month    On todays Norco Sleepiness Scale the patient scores a12 /24.       FAMILY HISTORY: No known sleep disorders. ?dad  SOCIAL HISTORY: GF, [place] [employer]    /72 (BP Location: Left arm, Patient Position: Sitting)   Pulse 98   Ht 5' 5" (1.651 m)   Wt 104.3 kg (230 lb)   SpO2 97%   BMI 38.27 kg/m²   17" neck circumference      ASSESSMENT:   Unspecified Sleep Apnea, with symptoms of disruptive snoring, witnessed apneic pauses, un-refreshing disrupted sleep and excessive daytime sleepiness,  with medical comorbidities of obesity and kerataconus. Warrants further investigation for untreated sleep apnea.     PLAN:   1.  Home Sleep Study, discussed plan of care (if + plan apap/nose with adherence monitoring)   2. Discussed etiology of WALESKA and potential ramifications of untreated WALESKA, including stroke, heart disease, HTN.  We discussed potential treatment options, which could include continuous positive airway pressure (CPAP-definitive), mandibular advancement splint by dentist  "

## 2023-07-28 ENCOUNTER — OFFICE VISIT (OUTPATIENT)
Dept: OPHTHALMOLOGY | Facility: CLINIC | Age: 33
End: 2023-07-28
Payer: COMMERCIAL

## 2023-07-28 DIAGNOSIS — S05.02XA CORNEAL ABRASION, LEFT, INITIAL ENCOUNTER: ICD-10-CM

## 2023-07-28 DIAGNOSIS — H18.603 KERATOCONUS OF BOTH EYES: ICD-10-CM

## 2023-07-28 DIAGNOSIS — H18.602 KERATOCONUS OF LEFT EYE: Primary | ICD-10-CM

## 2023-07-28 PROCEDURE — 99999 PR PBB SHADOW E&M-EST. PATIENT-LVL II: CPT | Mod: PBBFAC,,, | Performed by: OPHTHALMOLOGY

## 2023-07-28 PROCEDURE — 1159F PR MEDICATION LIST DOCUMENTED IN MEDICAL RECORD: ICD-10-PCS | Mod: CPTII,S$GLB,, | Performed by: OPHTHALMOLOGY

## 2023-07-28 PROCEDURE — 99214 PR OFFICE/OUTPT VISIT, EST, LEVL IV, 30-39 MIN: ICD-10-PCS | Mod: S$GLB,,, | Performed by: OPHTHALMOLOGY

## 2023-07-28 PROCEDURE — 3044F PR MOST RECENT HEMOGLOBIN A1C LEVEL <7.0%: ICD-10-PCS | Mod: CPTII,S$GLB,, | Performed by: OPHTHALMOLOGY

## 2023-07-28 PROCEDURE — 1159F MED LIST DOCD IN RCRD: CPT | Mod: CPTII,S$GLB,, | Performed by: OPHTHALMOLOGY

## 2023-07-28 PROCEDURE — 3044F HG A1C LEVEL LT 7.0%: CPT | Mod: CPTII,S$GLB,, | Performed by: OPHTHALMOLOGY

## 2023-07-28 PROCEDURE — 99214 OFFICE O/P EST MOD 30 MIN: CPT | Mod: S$GLB,,, | Performed by: OPHTHALMOLOGY

## 2023-07-28 PROCEDURE — 99999 PR PBB SHADOW E&M-EST. PATIENT-LVL II: ICD-10-PCS | Mod: PBBFAC,,, | Performed by: OPHTHALMOLOGY

## 2023-07-28 NOTE — PROGRESS NOTES
HPI    Ref Dr. Mclean     Keratoconus OU - dx'ed 27 yo   Cxl OS - 5/31/23    Pt here today for CXL f/u. Pt stated that during the 1st month OS was   irritated and red. Currently, pt denies irritation, redness, and pain. Pt   has started to wear his CL and no longer uses his drops.       Last edited by Nicolle Bunch MA on 7/28/2023  9:08 AM.            Assessment /Plan     For exam results, see Encounter Report.    Keratoconus of left eye  -     Collagen Cross-Linking; Future    Corneal abrasion, left, initial encounter    Keratoconus of both eyes      KERATOCONUS      The diagnosis of corneal ectasia and its etiology and clinical course were discussed. Treatment with the use of specialty contact lenses, collagen cross linking, and corneal transplantation was explained in detail. This patient has never had LASIK.     This patient would be a suitable candidate for Collagen Cross linking (CXL) -- the goal of treatment is to strengthen corneal bonds and prevent further progression / ectasia.  VA improvement - BCVA and VAsc - not guaranteed, however is seen in some cases. Most likely would recommend speciality CL fitting post treatment for BCVA.     This patient exhibits signs of progression of keratoconus and failure of conservative treatments with spectacles and/or contact lenses.      Disease progression is indicated by:  - An increase of >1D in the Kmax  - An increase of >1D of astigmatism in the MRx  - An increase in myopia of >0.50D on MRx     I recommend treatment with Collagen Crosslinking using the [employer]ved[employer] FDA approved epi-off protocol with Photrexa and the KXL System, in order to stabilize this condition.     Plan:   CXL treatment OD    I have informed the patient that we will seek insurance pre-approval, but in case of failure of the insurance company to cover the cost of this medically necessary procedure, there may be a cost of $5,000 for the patient.

## 2023-08-01 ENCOUNTER — TELEPHONE (OUTPATIENT)
Dept: SLEEP MEDICINE | Facility: OTHER | Age: 33
End: 2023-08-01
Payer: COMMERCIAL

## 2023-08-02 ENCOUNTER — HOSPITAL ENCOUNTER (OUTPATIENT)
Dept: SLEEP MEDICINE | Facility: HOSPITAL | Age: 33
Discharge: HOME | End: 2023-08-02
Attending: NURSE PRACTITIONER
Payer: COMMERCIAL

## 2023-08-02 DIAGNOSIS — G47.30 SLEEP APNEA, UNSPECIFIED TYPE: ICD-10-CM

## 2023-08-02 DIAGNOSIS — G47.33 OSA (OBSTRUCTIVE SLEEP APNEA): Primary | ICD-10-CM

## 2023-08-02 PROCEDURE — 95800 SLP STDY UNATTENDED: CPT

## 2023-08-09 PROBLEM — G47.33 OSA (OBSTRUCTIVE SLEEP APNEA): Status: ACTIVE | Noted: 2023-08-09

## 2023-08-09 PROCEDURE — 95806 SLEEP STUDY UNATT&RESP EFFT: CPT | Mod: 26,,, | Performed by: PSYCHIATRY & NEUROLOGY

## 2023-08-09 PROCEDURE — 95806 PR SLEEP STUDY, UNATTENDED, SIMUL RECORD HR/O2 SAT/RESP FLOW/RESP EFFT: ICD-10-PCS | Mod: 26,,, | Performed by: PSYCHIATRY & NEUROLOGY

## 2023-08-10 ENCOUNTER — PATIENT MESSAGE (OUTPATIENT)
Dept: SLEEP MEDICINE | Facility: CLINIC | Age: 33
End: 2023-08-10
Payer: COMMERCIAL

## 2023-08-10 DIAGNOSIS — G47.33 OSA (OBSTRUCTIVE SLEEP APNEA): Primary | ICD-10-CM

## 2023-08-12 ENCOUNTER — OFFICE VISIT (OUTPATIENT)
Dept: URGENT CARE | Facility: CLINIC | Age: 33
End: 2023-08-12
Payer: COMMERCIAL

## 2023-08-12 VITALS
HEART RATE: 78 BPM | OXYGEN SATURATION: 95 % | TEMPERATURE: 99 F | SYSTOLIC BLOOD PRESSURE: 112 MMHG | WEIGHT: 225.31 LBS | BODY MASS INDEX: 37.54 KG/M2 | DIASTOLIC BLOOD PRESSURE: 76 MMHG | RESPIRATION RATE: 18 BRPM | HEIGHT: 65 IN

## 2023-08-12 DIAGNOSIS — R30.0 DYSURIA: Primary | ICD-10-CM

## 2023-08-12 LAB
BILIRUB UR QL STRIP: NEGATIVE
C TRACH DNA SPEC QL NAA+PROBE: NOT DETECTED
GLUCOSE UR QL STRIP: NEGATIVE
KETONES UR QL STRIP: NEGATIVE
LEUKOCYTE ESTERASE UR QL STRIP: NEGATIVE
N GONORRHOEA DNA SPEC QL NAA+PROBE: NOT DETECTED
PH, POC UA: 9 (ref 5–8)
POC BLOOD, URINE: NEGATIVE
POC NITRATES, URINE: NEGATIVE
PROT UR QL STRIP: NEGATIVE
SP GR UR STRIP: 1.01 (ref 1–1.03)
UROBILINOGEN UR STRIP-ACNC: ABNORMAL (ref 0.3–2.2)

## 2023-08-12 PROCEDURE — 99214 PR OFFICE/OUTPT VISIT, EST, LEVL IV, 30-39 MIN: ICD-10-PCS | Mod: S$GLB,,, | Performed by: NURSE PRACTITIONER

## 2023-08-12 PROCEDURE — 99214 OFFICE O/P EST MOD 30 MIN: CPT | Mod: S$GLB,,, | Performed by: NURSE PRACTITIONER

## 2023-08-12 PROCEDURE — 87086 URINE CULTURE/COLONY COUNT: CPT | Performed by: NURSE PRACTITIONER

## 2023-08-12 PROCEDURE — 81003 POCT URINALYSIS, DIPSTICK, AUTOMATED, W/O SCOPE: ICD-10-PCS | Mod: QW,S$GLB,, | Performed by: NURSE PRACTITIONER

## 2023-08-12 PROCEDURE — 87661 TRICHOMONAS VAGINALIS AMPLIF: CPT | Performed by: NURSE PRACTITIONER

## 2023-08-12 PROCEDURE — 81003 URINALYSIS AUTO W/O SCOPE: CPT | Mod: QW,S$GLB,, | Performed by: NURSE PRACTITIONER

## 2023-08-12 PROCEDURE — 87591 N.GONORRHOEAE DNA AMP PROB: CPT | Performed by: NURSE PRACTITIONER

## 2023-08-12 RX ORDER — DOXYCYCLINE 100 MG/1
100 CAPSULE ORAL EVERY 12 HOURS
Qty: 14 CAPSULE | Refills: 0 | Status: SHIPPED | OUTPATIENT
Start: 2023-08-12 | End: 2023-08-19

## 2023-08-12 NOTE — PATIENT INSTRUCTIONS
Increase water intake  Rest  Avoid sexual contact until results are known, then for 2 weeks if any results are positive, while you are being treated.

## 2023-08-12 NOTE — PROGRESS NOTES
"Subjective:      Patient ID: Yves Yanez is a 32 y.o. male.    Vitals:  height is 5' 5" (1.651 m) and weight is 102.2 kg (225 lb 5 oz). His oral temperature is 98.9 °F (37.2 °C). His blood pressure is 112/76 and his pulse is 78. His respiration is 18 and oxygen saturation is 95%.     Chief Complaint: Urinary Tract Infection (Slight burn after urination - Entered by patient)    Pt c/o burning upon urination.  Reports 5/10 pain, in severity. Pt states has been sexually active with one person. Denies penile discharge, rash, or lesions. Reports he is circumcised     Urinary Tract Infection   This is a new problem. The current episode started today. The problem occurs every urination. The quality of the pain is described as burning. The pain is at a severity of 5/10. The pain is mild. There has been no fever. He is Sexually active. There is No history of pyelonephritis. Pertinent negatives include no hematuria. He has tried nothing for the symptoms. The treatment provided no relief.       Gastrointestinal:  Negative for abdominal pain.   Genitourinary:  Positive for dysuria. Negative for hematuria, genital sore, penile discharge, penile pain and penile swelling.   Musculoskeletal:  Negative for back pain.      Objective:     Physical Exam   Constitutional: He is oriented to person, place, and time.  Non-toxic appearance. He does not appear ill. No distress.   HENT:   Head: Normocephalic.   Nose: Nose normal.   Mouth/Throat: Mucous membranes are moist.   Cardiovascular: Normal rate.   Pulmonary/Chest: Effort normal.   Musculoskeletal: Normal range of motion.         General: Normal range of motion.   Neurological: He is alert and oriented to person, place, and time.   Skin: Skin is not diaphoretic.   Psychiatric: His behavior is normal. Mood normal.   Nursing note and vitals reviewed.    Results for orders placed or performed in visit on 08/12/23   C. trachomatis/N. gonorrhoeae by AMP DNA [facility]; Urine    Specimen: " Genital   Result Value Ref Range    Chlamydia, Amplified DNA Not Detected Not Detected    N gonorrhoeae, amplified DNA Not Detected Not Detected   CULTURE, URINE    Specimen: Urine, Clean Catch   Result Value Ref Range    Urine Culture, Routine No growth    POCT Urinalysis, Dipstick, Automated, W/O Scope   Result Value Ref Range    POC Blood, Urine Negative Negative    POC Bilirubin, Urine Negative Negative    POC Urobilinogen, Urine norm 0.3 - 2.2    POC Ketones, Urine Negative Negative    POC Protein, Urine Negative Negative    POC Nitrates, Urine Negative Negative    POC Glucose, Urine Negative Negative    pH, UA 9.0 (A) 5 - 8    POC Specific Gravity, Urine 1.010 1.003 - 1.029    POC Leukocytes, Urine Negative Negative        Assessment:     1. Dysuria        Plan:       Dysuria  -     POCT Urinalysis, Dipstick, Automated, W/O Scope  -     C. trachomatis/N. gonorrhoeae by AMP DNA [facility]; Urine  -     Trichomonas vaginalis, RNA, Qual, Urine  -     CULTURE, URINE  -     doxycycline (VIBRAMYCIN) 100 MG Cap; Take 1 capsule (100 mg total) by mouth every 12 (twelve) hours. for 7 days  Dispense: 14 capsule; Refill: 0      Patient Instructions   Increase water intake  Rest  Avoid sexual contact until results are known, then for 2 weeks if any results are positive, while you are being treated.

## 2023-08-13 ENCOUNTER — TELEPHONE (OUTPATIENT)
Dept: URGENT CARE | Facility: CLINIC | Age: 33
End: 2023-08-13
Payer: COMMERCIAL

## 2023-08-13 LAB — BACTERIA UR CULT: NO GROWTH

## 2023-08-13 NOTE — TELEPHONE ENCOUNTER
Patient called to discuss lab test results. Patient was advised to call us back if he has any questions/concerns.

## 2023-08-13 NOTE — TELEPHONE ENCOUNTER
Informed pt of negative lab testing thus far. Urine culture and trichomonas testing still pending.

## 2023-08-15 LAB
SPECIMEN SOURCE: NORMAL
T VAGINALIS RRNA SPEC QL NAA+PROBE: NEGATIVE

## 2023-08-17 ENCOUNTER — TELEPHONE (OUTPATIENT)
Dept: URGENT CARE | Facility: CLINIC | Age: 33
End: 2023-08-17
Payer: COMMERCIAL

## 2023-08-17 NOTE — TELEPHONE ENCOUNTER
----- Message from GIANNA Hoang sent at 8/17/2023  1:06 PM CDT -----    ----- Message -----  From: Jeannie Thompson FNP-BC  Sent: 8/15/2023   8:17 PM CDT  To: GIANNA Hoang      ----- Message -----  From: Isidra Romo MA  Sent: 8/12/2023   3:02 PM CDT  To: #

## 2023-08-17 NOTE — TELEPHONE ENCOUNTER
----- Message from Jeannie Thompson FNP-BC sent at 8/17/2023  1:06 PM CDT -----    ----- Message -----  From: Isidra Romo MA  Sent: 8/12/2023   3:02 PM CDT  To: #

## 2023-08-21 ENCOUNTER — PATIENT MESSAGE (OUTPATIENT)
Dept: RESEARCH | Facility: HOSPITAL | Age: 33
End: 2023-08-21
Payer: COMMERCIAL

## 2023-09-29 ENCOUNTER — OCCUPATIONAL HEALTH (OUTPATIENT)
Dept: URGENT CARE | Facility: CLINIC | Age: 33
End: 2023-09-29

## 2023-09-29 VITALS — WEIGHT: 220 LBS | BODY MASS INDEX: 36.61 KG/M2

## 2023-09-29 DIAGNOSIS — Z02.89 ENCOUNTER FOR EXAMINATION REQUIRED BY DEPARTMENT OF TRANSPORTATION (DOT): Primary | ICD-10-CM

## 2023-09-29 PROCEDURE — 99499 PHYSICAL, RECERT DOT/CDL: ICD-10-PCS | Mod: S$GLB,,, | Performed by: NURSE PRACTITIONER

## 2023-09-29 PROCEDURE — 99499 UNLISTED E&M SERVICE: CPT | Mod: S$GLB,,, | Performed by: NURSE PRACTITIONER

## 2024-01-08 ENCOUNTER — PATIENT MESSAGE (OUTPATIENT)
Dept: URGENT CARE | Facility: CLINIC | Age: 34
End: 2024-01-08
Payer: COMMERCIAL

## 2024-01-16 ENCOUNTER — OFFICE VISIT (OUTPATIENT)
Dept: URGENT CARE | Facility: CLINIC | Age: 34
End: 2024-01-16
Payer: COMMERCIAL

## 2024-01-16 VITALS
BODY MASS INDEX: 36.65 KG/M2 | RESPIRATION RATE: 18 BRPM | HEART RATE: 96 BPM | SYSTOLIC BLOOD PRESSURE: 128 MMHG | TEMPERATURE: 98 F | DIASTOLIC BLOOD PRESSURE: 82 MMHG | HEIGHT: 65 IN | WEIGHT: 220 LBS | OXYGEN SATURATION: 96 %

## 2024-01-16 DIAGNOSIS — R36.1 BLOOD IN SEMEN: Primary | ICD-10-CM

## 2024-01-16 DIAGNOSIS — Z20.2 POSSIBLE EXPOSURE TO STD: ICD-10-CM

## 2024-01-16 LAB
BILIRUB UR QL STRIP: NEGATIVE
GLUCOSE UR QL STRIP: NEGATIVE
KETONES UR QL STRIP: NEGATIVE
LEUKOCYTE ESTERASE UR QL STRIP: NEGATIVE
PH, POC UA: 8
POC BLOOD, URINE: POSITIVE
POC NITRATES, URINE: NEGATIVE
PROT UR QL STRIP: NEGATIVE
SP GR UR STRIP: 1.01 (ref 1–1.03)
UROBILINOGEN UR STRIP-ACNC: NORMAL (ref 0.3–2.2)

## 2024-01-16 PROCEDURE — 81003 URINALYSIS AUTO W/O SCOPE: CPT | Mod: QW,S$GLB,, | Performed by: NURSE PRACTITIONER

## 2024-01-16 PROCEDURE — 87491 CHLMYD TRACH DNA AMP PROBE: CPT | Performed by: NURSE PRACTITIONER

## 2024-01-16 PROCEDURE — 99214 OFFICE O/P EST MOD 30 MIN: CPT | Mod: S$GLB,,, | Performed by: NURSE PRACTITIONER

## 2024-01-16 PROCEDURE — 87661 TRICHOMONAS VAGINALIS AMPLIF: CPT | Performed by: NURSE PRACTITIONER

## 2024-01-16 NOTE — PROGRESS NOTES
"Subjective:      Patient ID: Yves Yanez Jr. is a 33 y.o. male.    Vitals:  height is 5' 5" (1.651 m) and weight is 99.8 kg (220 lb 0.3 oz). His oral temperature is 98 °F (36.7 °C). His blood pressure is 128/82 and his pulse is 96. His respiration is 18 and oxygen saturation is 96%.     Chief Complaint: Urinary Frequency    Pt coming into clinic with itching on private area, blood in urine, testicular pain after intercourse, sx started 1 week ago, treatment includes nothing.   Provider note below:  This is a 33 y.o. male who presents today with a chief complaint of blood in his semen after sexual intercourse, patient reports symptoms started 1 week ago, patient reports at that time he had left testicular pain after the intercourse and noticed the blood in his semen, patient reports in the beginning 1 week ago there was more blood in the semen and after that it got better and he noticed the last blood in his semen, patient reports it resolved for 2 days and then he came back again last night after the sexual intercourse blood in his semen, patient reports he did have the itching on the penile area which is resolved now, patient reports his left testicular pain is also resolved but still noticing the blood in his semen, patient denies any penile discharge, denies dysuria, denies urinary frequency or urgency, patient denies any known exposure to STD but requesting testing, patient denies scrotal pain or testicular pain, denies any scrotal or testicular swelling, denies penile pain or discharge, denies painful ejaculation or painful sexual intercourse, denies fever, body aches or chills, denies cough, wheezing or shortness of breath, denies nausea, vomiting, diarrhea or abdominal pain, denies chest pain or dizziness positional lightheadedness, denies sore throat or trouble swallowing, denies loss of taste or smell, or any other symptoms       Other  This is a new problem. The current episode started 1 to 4 weeks ago. The " problem occurs constantly. The problem has been unchanged. Pertinent negatives include no abdominal pain, anorexia, arthralgias, change in bowel habit, chest pain, chills, congestion, coughing, diaphoresis, fatigue, fever, headaches, joint swelling, myalgias, nausea, neck pain, numbness, rash, sore throat, swollen glands, urinary symptoms, vertigo, visual change, vomiting or weakness. The symptoms are aggravated by walking. He has tried nothing for the symptoms. The treatment provided no relief.       Constitution: Negative for chills, sweating, fatigue and fever.   HENT:  Negative for congestion and sore throat.    Neck: Negative for neck pain.   Cardiovascular:  Negative for chest pain.   Respiratory:  Negative for cough.    Gastrointestinal:  Negative for abdominal pain, nausea and vomiting.   Genitourinary:  Positive for hematuria. Negative for dysuria, frequency, urine decreased, genital trauma, genital sore, penile discharge, painful ejaculation, penile pain, penile swelling, scrotal swelling and testicular pain.   Musculoskeletal:  Negative for joint pain, joint swelling and muscle ache.   Skin:  Negative for rash.   Neurological:  Negative for history of vertigo, headaches and numbness.      Objective:     Physical Exam   Constitutional: He is oriented to person, place, and time. He appears well-developed. No distress.   HENT:   Head: Normocephalic and atraumatic.   Ears:   Right Ear: External ear normal.   Left Ear: External ear normal.   Nose: Nose normal. No nasal deformity. No epistaxis.   Mouth/Throat: Oropharynx is clear and moist and mucous membranes are normal.   Eyes: Conjunctivae and lids are normal.   Neck: Trachea normal and phonation normal. Neck supple.   Cardiovascular: Normal rate, regular rhythm and normal heart sounds.   Pulmonary/Chest: Effort normal and breath sounds normal.   Abdominal: Normal appearance and bowel sounds are normal. He exhibits no distension. Soft. There is no abdominal  tenderness. There is no left CVA tenderness and no right CVA tenderness.   Musculoskeletal: Normal range of motion.         General: Normal range of motion.   Neurological: He is alert and oriented to person, place, and time. He has normal reflexes.   Skin: Skin is warm, dry, intact and not diaphoretic.   Psychiatric: His speech is normal and behavior is normal. Judgment and thought content normal.   Nursing note and vitals reviewed.    Results for orders placed or performed in visit on 01/16/24   POCT Urinalysis, Dipstick, Automated, W/O Scope   Result Value Ref Range    POC Blood, Urine Positive (A) Negative, Positive Slide, Positive Tube    POC Bilirubin, Urine Negative Negative, Positive Slide, Positive Tube    POC Urobilinogen, Urine Normal 0.3 - 2.2    POC Ketones, Urine Negative Negative, Positive Slide, Positive Tube    POC Protein, Urine Negative Negative, Positive Slide, Positive Tube    POC Nitrates, Urine Negative Negative, Positive Slide, Positive Tube    POC Glucose, Urine Negative Negative, Positive Slide, Positive Tube    pH, UA 8.0     POC Specific Gravity, Urine 1.010 1.003 - 1.029    POC Leukocytes, Urine Negative Negative, Positive Slide, Positive Tube         Patient in no acute distress.  Vitals reassuring.  Discussed results/diagnosis/plan in depth with patient in clinic. Strict precautions given to patient to monitor for worsening signs and symptoms. Advised to follow up with primary.All questions answered. Strict ER precautions given. If your symptoms worsens or fail to improve you should go to the Emergency Room. Discharge and follow-up instructions given verbally/printed. Discharge and follow-up instructions discussed with the patient who expressed understanding and willingness to comply with my recommendations.Patient voiced understanding and in agreement with current treatment plan.     Please be advised this text was dictated with [employer] software and may contain errors due to  translation.   Assessment:     1. Blood in semen    2. Possible exposure to STD        Plan:       Blood in semen  -     POCT Urinalysis, Dipstick, Automated, W/O Scope  -     Cancel: Ambulatory referral/consult to Urology  -     C. trachomatis/N. gonorrhoeae by AMP DNA  -     Trichomonas vaginalis, RNA, Qual, Urine  -     Ambulatory referral/consult to Urology    Possible exposure to STD  -     Cancel: Ambulatory referral/consult to Urology  -     C. trachomatis/N. gonorrhoeae by AMP DNA  -     Trichomonas vaginalis, RNA, Qual, Urine          Medical Decision Making:   Clinical Tests:   Lab Tests: Reviewed  Urgent Care Management:  Patient in no acute distress.  Vitals reassuring.  On exam, patient is nontoxic appearing and afebrile.  Lungs CTA.  Patient reported left testicular pain started 1 week ago which resolved in 2 days, patient reports he started noticing blood in his semen after sexual intercourse for past 1 week.  Denies any testicular pain currently.  Patient denies any penile discharge or penile pain.  Reports penile itching which is also resolved.  I discussed with patient in detail based on his symptoms and recommended treatment for STD, patient only requesting the STD testing and would like to get the treatment after the STD testing results.  I will refer patient to urologist for further evaluation secondary to noticing blood in semen.  I called clinic  and schedule appointment for 01/18/2024 with urologist.  Appointment time and location given to patient.  Strict ER precautions discussed with patient in detail.  I again reiterated the importance of treatment for possible STD, patient declined at this time and would like to wait until the results come back.  Medication prescribed and over-the-counter medication discussed with patient at length.  Proper hydration advised.  I reiterated the importance of further evaluation if no improvement symptoms and follow-up with primary. Patient voiced  understanding and in agreement with current treatment plan.             Patient Instructions   STD Screening  You were tested for STDs today, we will call you in 5-7 days with the results of the testing  If you need more medication when the labs result come in, we will call you and phone them in for you.    Increase condom use to prevent occurance.      IF POSITIVE:  Notify sexual partners of the need for testing.    Complete ALL medication prescribed.    NO sexual intercourse for 7 days after treatment.   Retest to ensure infection has cleared-there are infections that require more agressive treatment.  Retest for all in 6 weeks and again in 6 months to ensure true negative results.      Today's testing will give no crediable information if you have unprotected sexual activities going forward.     Syphillis cases are rising!    Gonorrhea has RESISTANT strains which is why repeat testing after treatment is important.    Gonorrhea may be present in multiple sites from just ONE area of exposure.      REMEMBER WEAR CONDOMS AND GET TESTED OFTEN.

## 2024-01-17 LAB
C TRACH DNA SPEC QL NAA+PROBE: NOT DETECTED
N GONORRHOEA DNA SPEC QL NAA+PROBE: NOT DETECTED

## 2024-01-18 ENCOUNTER — OFFICE VISIT (OUTPATIENT)
Dept: UROLOGY | Facility: CLINIC | Age: 34
End: 2024-01-18
Payer: COMMERCIAL

## 2024-01-18 VITALS
WEIGHT: 237.56 LBS | HEART RATE: 89 BPM | BODY MASS INDEX: 39.58 KG/M2 | SYSTOLIC BLOOD PRESSURE: 106 MMHG | HEIGHT: 65 IN | DIASTOLIC BLOOD PRESSURE: 69 MMHG

## 2024-01-18 DIAGNOSIS — R36.1 HEMATOSPERMIA: Primary | ICD-10-CM

## 2024-01-18 LAB
BACTERIA #/AREA URNS AUTO: NORMAL /HPF
MICROSCOPIC COMMENT: NORMAL
RBC #/AREA URNS AUTO: 1 /HPF (ref 0–4)
SPECIMEN SOURCE: NORMAL
SQUAMOUS #/AREA URNS AUTO: 0 /HPF
T VAGINALIS RRNA SPEC QL NAA+PROBE: NEGATIVE
WBC #/AREA URNS AUTO: 0 /HPF (ref 0–5)

## 2024-01-18 PROCEDURE — 87086 URINE CULTURE/COLONY COUNT: CPT | Performed by: UROLOGY

## 2024-01-18 PROCEDURE — 99203 OFFICE O/P NEW LOW 30 MIN: CPT | Mod: S$GLB,,, | Performed by: UROLOGY

## 2024-01-18 PROCEDURE — 1160F RVW MEDS BY RX/DR IN RCRD: CPT | Mod: CPTII,S$GLB,, | Performed by: UROLOGY

## 2024-01-18 PROCEDURE — 81001 URINALYSIS AUTO W/SCOPE: CPT | Performed by: UROLOGY

## 2024-01-18 PROCEDURE — 3078F DIAST BP <80 MM HG: CPT | Mod: CPTII,S$GLB,, | Performed by: UROLOGY

## 2024-01-18 PROCEDURE — 3008F BODY MASS INDEX DOCD: CPT | Mod: CPTII,S$GLB,, | Performed by: UROLOGY

## 2024-01-18 PROCEDURE — 1159F MED LIST DOCD IN RCRD: CPT | Mod: CPTII,S$GLB,, | Performed by: UROLOGY

## 2024-01-18 PROCEDURE — 99999 PR PBB SHADOW E&M-EST. PATIENT-LVL III: CPT | Mod: PBBFAC,,, | Performed by: UROLOGY

## 2024-01-18 PROCEDURE — 3074F SYST BP LT 130 MM HG: CPT | Mod: CPTII,S$GLB,, | Performed by: UROLOGY

## 2024-01-18 NOTE — PROGRESS NOTES
Subjective:       Patient ID: Yves Yanez Jr. is a 33 y.o. male.    Chief Complaint: Blood in Semen     This is a 33 y.o.  male patient that is new to me.  The patient was referred to me by Charlette Nelson NP for hematospermia.    Patient states he was having sex one week ago and noted bright red blood in his semen. Happened once prior years ago. Has persisted for the past week. No snapping, popping, loss of erection. No penile swelling or discharge. No gross hematuria, dysuria.         Lab Results   Component Value Date    CREATININE 1.1 07/26/2023       ---  PMH/PSH/Medications/Allergies/Social history reviewed and as in chart.    Review of Systems    Objective:      Physical Exam  Constitutional:       General: He is not in acute distress.     Appearance: Normal appearance.   HENT:      Head: Normocephalic and atraumatic.      Right Ear: External ear normal.      Left Ear: External ear normal.      Nose: Nose normal.      Mouth/Throat:      Mouth: Mucous membranes are moist.   Eyes:      Extraocular Movements: Extraocular movements intact.   Pulmonary:      Effort: Pulmonary effort is normal. No respiratory distress.   Abdominal:      General: There is no distension.      Palpations: Abdomen is soft.   Genitourinary:     Comments: Circumcised male with orthotopic meatus  Testicles normal in size and contour  Musculoskeletal:         General: Normal range of motion.      Cervical back: Normal range of motion.   Skin:     General: Skin is warm and dry.   Neurological:      General: No focal deficit present.      Mental Status: He is alert.   Psychiatric:         Mood and Affect: Mood normal.         Behavior: Behavior normal.         Assessment:     Problem Noted   Hematospermia 1/18/2024 1/2024         Plan:     We discussed that hematospermia is a benign and typically self limited process.   We also discussed the finding of hematuria on a POCT done at urgent care. Will send his urine for microscopic  analysis. If positive will proceeed with hematuria workup.    Marlo Plummer MD    The above referenced imaging and interpretations were personally reviewed.  Disclaimer: This note has been generated using voice-recognition software. There may be typographical errors that have been missed during proof-reading.

## 2024-01-19 ENCOUNTER — TELEPHONE (OUTPATIENT)
Dept: URGENT CARE | Facility: CLINIC | Age: 34
End: 2024-01-19
Payer: COMMERCIAL

## 2024-01-19 LAB — BACTERIA UR CULT: NO GROWTH

## 2024-01-19 NOTE — TELEPHONE ENCOUNTER
Called pt and discussed negative STI testing.  Pt reports that he went to follow-up appt yesterday with Urology and is awaiting further test results, feeling better.

## 2024-07-18 ENCOUNTER — PATIENT MESSAGE (OUTPATIENT)
Dept: URGENT CARE | Facility: CLINIC | Age: 34
End: 2024-07-18
Payer: COMMERCIAL

## 2024-10-11 ENCOUNTER — OCCUPATIONAL HEALTH (OUTPATIENT)
Dept: URGENT CARE | Facility: CLINIC | Age: 34
End: 2024-10-11

## 2024-10-11 DIAGNOSIS — Z02.89 ENCOUNTER FOR EXAMINATION REQUIRED BY DEPARTMENT OF TRANSPORTATION (DOT): Primary | ICD-10-CM

## 2025-04-21 RX ORDER — VALACYCLOVIR HYDROCHLORIDE 500 MG/1
500 TABLET, FILM COATED ORAL 3 TIMES DAILY
Qty: 30 TABLET | Refills: 0 | OUTPATIENT
Start: 2025-04-21 | End: 2025-05-01

## 2025-04-25 ENCOUNTER — OFFICE VISIT (OUTPATIENT)
Dept: OPHTHALMOLOGY | Facility: CLINIC | Age: 35
End: 2025-04-25
Payer: COMMERCIAL

## 2025-04-25 DIAGNOSIS — H18.602 KERATOCONUS OF LEFT EYE: Primary | ICD-10-CM

## 2025-04-25 DIAGNOSIS — S05.02XA CORNEAL ABRASION, LEFT, INITIAL ENCOUNTER: ICD-10-CM

## 2025-04-25 DIAGNOSIS — H18.603 KERATOCONUS OF BOTH EYES: ICD-10-CM

## 2025-04-25 PROCEDURE — 99999 PR PBB SHADOW E&M-EST. PATIENT-LVL I: CPT | Mod: PBBFAC,,, | Performed by: OPHTHALMOLOGY

## 2025-04-25 NOTE — PROGRESS NOTES
HPI    Ref Dr. 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            Assessment /Plan     For exam results, see Encounter Report.    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 Treatment with the use of specialty contact lenses, collagen cross linking, and corneal transplantation was explained in detail. This patient has never had LASIK.     This patient would be a suitable candidate for Collagen Cross linking (CXL) -- the goal of treatment is to strengthen corneal bonds and prevent further progression / ectasia.  VA improvement - BCVA and VAsc - not guaranteed, however is seen in some cases. Most likely would recommend speciality CL fitting post treatment for BCVA.     This patient exhibits signs of progression of keratoconus and failure of conservative treatments with spectacles and/or contact lenses.      Disease progression is indicated by:  - An increase of >1D in the Kmax  - An increase of >1D of astigmatism in the MRx  - An increase in myopia of >0.50D on MRx     I recommend treatment with Collagen Crosslinking using the Avedro FDA approved epi-off protocol with Photrexa and the KXL System, in order to stabilize this condition.     Plan:   CXL treatment OD    I have informed the patient that we will seek insurance pre-approval, but in case of failure of the insurance company to cover the cost of this medically necessary procedure, there may be a cost of $5,000 for the patient.    Today's visit is associated with current and anticipated ongoing medical care related to this patient's single serious/complex condition  (cornea). Follow up is to be continued indefinitely to monitor the condition.       New rx today for CL breaks.

## 2025-04-29 ENCOUNTER — TELEPHONE (OUTPATIENT)
Dept: FAMILY MEDICINE | Facility: CLINIC | Age: 35
End: 2025-04-29

## 2025-04-29 ENCOUNTER — OFFICE VISIT (OUTPATIENT)
Dept: FAMILY MEDICINE | Facility: CLINIC | Age: 35
End: 2025-04-29
Payer: COMMERCIAL

## 2025-04-29 DIAGNOSIS — F41.0 PANIC ATTACK: ICD-10-CM

## 2025-04-29 DIAGNOSIS — F41.1 GAD (GENERALIZED ANXIETY DISORDER): Primary | ICD-10-CM

## 2025-04-29 PROCEDURE — 98005 SYNCH AUDIO-VIDEO EST LOW 20: CPT | Mod: 95,,, | Performed by: FAMILY MEDICINE

## 2025-04-29 RX ORDER — HYDROXYZINE HYDROCHLORIDE 25 MG/1
25 TABLET, FILM COATED ORAL 3 TIMES DAILY PRN
Qty: 30 TABLET | Refills: 1 | Status: SHIPPED | OUTPATIENT
Start: 2025-04-29

## 2025-04-29 RX ORDER — ESCITALOPRAM OXALATE 10 MG/1
10 TABLET ORAL DAILY
Qty: 30 TABLET | Refills: 1 | Status: SHIPPED | OUTPATIENT
Start: 2025-04-29 | End: 2026-04-29

## 2025-04-29 NOTE — PROGRESS NOTES
The patient location is: [place]    The chief complaint leading to consultation is: Anxiety    Visit type: audiovisual    Face to Face time with patient: 8 mins    30 minutes of total time spent on the encounter, which includes face to face time and non-face to face time preparing to see the patient (eg, review of tests), Obtaining and/or reviewing separately obtained history, Documenting clinical information in the electronic or other health record, Independently interpreting results (not separately reported) and communicating results to the patient/family/caregiver, or Care coordination (not separately reported).     Each patient to whom he or she provides medical services by telemedicine is:  (1) informed of the relationship between the physician and patient and the respective role of any other health care provider with respect to management of the patient; and (2) notified that he or she may decline to receive medical services by telemedicine and may withdraw from such care at any time.    (Portions of this note were dictated using voice recognition software and may contain dictation related errors in spelling/grammar/syntax not found on text review)      HPI: 34 y.o. male presented for virtual visit, last seen 07/2023, has never seen me in person.  He has medical history significant for generalized disorder and major depression, obstructive sleep apnea.    Patient has concerns about feeling sad and depressed lately, has acute stressors in life, would like to restart anxiety medication.  He has tried fluoxetine as well as Lexapro previously, on questioning he states that Lexapro worked better than fluoxetine and would like to restarted, also reports having panic attacks in which she feels very anxious and nervous.  Denies having any suicidal/homicidal ideations.    No other symptoms or concerns reported    No past medical history on file.    No past surgical history on file.    Family History   Problem  Relation Name Age of Onset    Amblyopia Neg Hx      Blindness Neg Hx      Cataracts Neg Hx      Glaucoma Neg Hx      Macular degeneration Neg Hx      Retinal detachment Neg Hx      Strabismus Neg Hx         Social History[1]    Lab Results   Component Value Date    WBC 8.69 07/26/2023    HGB 13.4 (L) 07/26/2023    HCT 41.9 07/26/2023    MCV 79 (L) 07/26/2023     07/26/2023    CHOL 184 07/26/2023    TRIG 264 (H) 07/26/2023    HDL 45 07/26/2023    ALT 31 10/16/2024    AST 15 10/16/2024    BILITOT 0.3 10/16/2024    ALKPHOS 78 07/26/2023     10/16/2024    K 3.5 (L) 10/16/2024     07/26/2023    CREATININE 1.25 10/16/2024    CALCIUM 9.4 10/16/2024    ALBUMIN 3.7 10/16/2024    BUN 7.0 10/16/2024    CO2 32 10/16/2024    TSH 1.710 07/26/2023    HGBA1C 5.8 (H) 07/26/2023    LDLCALC 86.2 07/26/2023     07/26/2023             Vital signs reviewed  PE:   APPEARANCE: Well nourished, well developed, in no acute distress.    HEAD: Normocephalic, atraumatic.  EYES: EOMI.  Conjunctivae noninjected.  EXTREMITIES: No edema, cyanosis, or clubbing.    Review of Systems   Constitutional:  Negative for activity change, chills, fatigue and unexpected weight change.   HENT:  Negative for hearing loss, rhinorrhea and trouble swallowing.    Eyes:  Negative for discharge and visual disturbance.   Respiratory:  Negative for chest tightness and wheezing.    Cardiovascular:  Negative for chest pain and palpitations.   Gastrointestinal:  Negative for blood in stool, constipation, diarrhea and vomiting.   Endocrine: Negative for polydipsia and polyuria.   Genitourinary:  Negative for difficulty urinating, hematuria and urgency.   Musculoskeletal:  Negative for arthralgias, joint swelling and neck pain.   Neurological:  Negative for weakness and headaches.   Psychiatric/Behavioral:  Positive for depressed mood. Negative for confusion, decreased concentration and dysphoric mood. The patient is nervous/anxious.    All other  systems reviewed and are negative.      IMPRESSION  1. AMPARO (generalized anxiety disorder)    2. Panic attack            PLAN      1. AMPARO (generalized anxiety disorder) (Primary)    - EScitalopram oxalate (LEXAPRO) 10 MG tablet; Take 1 tablet (10 mg total) by mouth once daily.  Dispense: 30 tablet; Refill: 1    - hydroxyzine HCL (ATARAX) 25 MG tablet; Take 1 tablet (25 mg total) by mouth 3 (three) times daily as needed for Anxiety.  Dispense: 30 tablet; Refill: 1      2. Panic attack     - hydrOXYzine HCL (ATARAX) 25 MG tablet; Take 1 tablet (25 mg total) by mouth 3 (three) times daily as needed for Anxiety.  Dispense: 30 tablet; Refill: 1        SCREENINGS        Age/demographic appropriate health maintenance:    Health Maintenance Due   Topic Date Due    Hepatitis C Screening  Never done    HIV Screening  Never done    TETANUS VACCINE  Never done    Pneumococcal Vaccines (Age 0-49) (1 of 2 - PCV) Never done    Influenza Vaccine (1) Never done    COVID-19 Vaccine (3 - 2024-25 season) 09/01/2024           New patient appointment scheduled so that he can come to establish care with PCP       Spent adequate time in obtaining history and explaining differentials     30 minutes spent during this visit of which greater than 50% devoted to face-face counseling and coordination of care regarding diagnosis and management plan       Starr Guardado   4/29/2025       [1]   Social History  Tobacco Use    Smoking status: Every Day     Types: Vaping with nicotine    Smokeless tobacco: Never    Tobacco comments:     Former cigarette smoker. Now vapes. Want to quit.   Substance Use Topics    Alcohol use: Yes     Comment: occasionally    Drug use: No

## 2025-05-09 ENCOUNTER — OFFICE VISIT (OUTPATIENT)
Dept: URGENT CARE | Facility: CLINIC | Age: 35
End: 2025-05-09
Payer: COMMERCIAL

## 2025-05-09 VITALS
HEIGHT: 65 IN | DIASTOLIC BLOOD PRESSURE: 71 MMHG | TEMPERATURE: 98 F | OXYGEN SATURATION: 97 % | BODY MASS INDEX: 38.32 KG/M2 | HEART RATE: 88 BPM | RESPIRATION RATE: 16 BRPM | SYSTOLIC BLOOD PRESSURE: 118 MMHG | WEIGHT: 230 LBS

## 2025-05-09 DIAGNOSIS — B96.89 ACUTE BACTERIAL SINUSITIS: Primary | ICD-10-CM

## 2025-05-09 DIAGNOSIS — J01.90 ACUTE BACTERIAL SINUSITIS: Primary | ICD-10-CM

## 2025-05-09 RX ORDER — TOPIRAMATE 25 MG/1
25 TABLET ORAL 2 TIMES DAILY
COMMUNITY
Start: 2025-03-31

## 2025-05-09 RX ORDER — CEFDINIR 300 MG/1
300 CAPSULE ORAL 2 TIMES DAILY
Qty: 20 CAPSULE | Refills: 0 | Status: SHIPPED | OUTPATIENT
Start: 2025-05-09 | End: 2025-05-19

## 2025-05-09 NOTE — PROGRESS NOTES
"Subjective:      Patient ID: Yves Yanez Jr. is a 34 y.o. male.    Vitals:  height is 5' 5" (1.651 m) and weight is 104.3 kg (230 lb). His oral temperature is 98.3 °F (36.8 °C). His blood pressure is 118/71 and his pulse is 88. His respiration is 16 and oxygen saturation is 97%.     Chief Complaint: Eye Problem    This is a 34 y.o. male who presents today with a chief complaint of   Left eye sore and red, no discharge, no injury. Pt wears hard contacts. He takes them out every night and cleans them. Pt recently had a URI and the pain is "like behind the eye." Pt had some Doxycycline antibiotic he took and that started to help as well.      Eye Problem   The left eye is affected. This is a new problem. The current episode started in the past 7 days. The problem has been waxing and waning. There was no injury mechanism. The pain is at a severity of 2/10. The pain is mild. There is Known exposure to pink eye. He Wears contacts. Associated symptoms include blurred vision (not new, several months, seen by opthamologist for this), eye redness, itching and a recent URI. Pertinent negatives include no eye discharge, double vision, fever, foreign body sensation, nausea, photophobia or vomiting. He has tried nothing for the symptoms. The treatment provided no relief.       Constitution: Negative for chills, fatigue and fever.   HENT:  Positive for sinus pain and sinus pressure. Negative for ear pain, sore throat and trouble swallowing.    Eyes:  Positive for eye itching, eye pain, eye redness and blurred vision (not new, several months, seen by opthamologist for this). Negative for eye trauma, foreign body in eye, eye discharge, photophobia, vision loss, double vision and eyelid swelling.   Gastrointestinal:  Negative for nausea and vomiting.      Objective:     Physical Exam   Constitutional: He is oriented to person, place, and time. He appears well-developed. He is cooperative.  Non-toxic appearance. He does not appear ill. " No distress.   HENT:   Head: Normocephalic and atraumatic.   Ears:   Right Ear: Hearing, tympanic membrane, external ear and ear canal normal.   Left Ear: Hearing, tympanic membrane, external ear and ear canal normal.   Nose: Purulent discharge present. No mucosal edema, rhinorrhea or nasal deformity. No epistaxis. Right sinus exhibits no maxillary sinus tenderness and no frontal sinus tenderness. Left sinus exhibits maxillary sinus tenderness. Left sinus exhibits no frontal sinus tenderness.   Mouth/Throat: Uvula is midline, oropharynx is clear and moist and mucous membranes are normal. No trismus in the jaw. Normal dentition. No uvula swelling. No oropharyngeal exudate, posterior oropharyngeal edema or posterior oropharyngeal erythema.   Eyes: Conjunctivae and lids are normal. Lids are everted and swept, no foreign bodies found. No visual field deficit is present. Right eye exhibits no discharge and no hordeolum. No foreign body present in the right eye. Left eye exhibits no discharge and no hordeolum. No foreign body present in the left eye. No scleral icterus. Extraocular movement intact vision grossly intact   Neck: Trachea normal and phonation normal. Neck supple. No edema present. No erythema present. No neck rigidity present.   Cardiovascular: Normal rate, regular rhythm, normal heart sounds and normal pulses.   Pulmonary/Chest: Effort normal and breath sounds normal. No respiratory distress. He has no decreased breath sounds. He has no rhonchi.   Abdominal: Normal appearance.   Musculoskeletal: Normal range of motion.         General: No deformity. Normal range of motion.   Neurological: He is alert and oriented to person, place, and time. He exhibits normal muscle tone. Coordination normal.   Skin: Skin is warm, dry, intact, not diaphoretic and not pale.   Psychiatric: His speech is normal and behavior is normal. Judgment and thought content normal.   Nursing note and vitals reviewed.      Assessment:      1. Acute bacterial sinusitis        Plan:     Patient seen by his opthalmologist 4/25/25.  Hx of keratoconus.  Hx of sleep apnea with cpap.  Advised to follow up with his eye specialist for chronic condition.    Acute bacterial sinusitis  -     cefdinir (OMNICEF) 300 MG capsule; Take 1 capsule (300 mg total) by mouth 2 (two) times daily. for 10 days  Dispense: 20 capsule; Refill: 0

## 2025-05-09 NOTE — PATIENT INSTRUCTIONS
Please drink plenty of fluids.  Please get plenty of rest.  Please return here or go to the Emergency Department for any concerns or worsening of condition.  If you were prescribed antibiotics, please take them to completion.  Consider adding over-the-counter PROBIOTICS to decrease some side-effects associated with antibiotics. When a person takes antibiotics, both the harmful bacteria and the beneficial bacteria are killed. A reduction of beneficial bacteria can lead to digestive problems, such as diarrhea, yeast infections and urinary tract infections.  If you do not have Hypertension or any history of palpitations, it is ok to take over the counter Sudafed or Mucinex D or Allegra-D or Claritin-D or Zyrtec-D.  If you do take one of the above, it is ok to combine that with plain over the counter Mucinex or Allegra or Claritin or Zyrtec.  If for example you are taking Zyrtec -D, you can combine that with Mucinex, but not Mucinex-D.  If you are taking Mucinex-D, you can combine that with plain Allegra or Claritin or Zyrtec.   If you do have Hypertension or palpitations, it is safe to take Coricidin HBP for relief of sinus symptoms.  We recommend you take over the counter Flonase (Fluticasone) or another nasally inhaled steroid unless you are already taking one.  Nasal irrigation with a saline spray or Netti Pot like device per their directions is also recommended.  If not allergic, please take over the counter Tylenol (Acetaminophen) and/or Motrin (Ibuprofen) as directed for control of pain and/or fever.  Please follow up with your primary care doctor and your eye doctor    If you  smoke, please stop smoking.

## 2025-07-02 DIAGNOSIS — F41.1 GAD (GENERALIZED ANXIETY DISORDER): ICD-10-CM

## 2025-07-02 RX ORDER — ESCITALOPRAM OXALATE 10 MG/1
10 TABLET ORAL
Qty: 30 TABLET | Refills: 0 | Status: SHIPPED | OUTPATIENT
Start: 2025-07-02

## 2025-08-08 ENCOUNTER — TELEPHONE (OUTPATIENT)
Dept: OPHTHALMOLOGY | Facility: CLINIC | Age: 35
End: 2025-08-08
Payer: COMMERCIAL

## 2025-08-08 NOTE — TELEPHONE ENCOUNTER
Called to confirm procedure and to inform pt to  drops from his pharmacy.  Pt will start using drops 3 times daily OD and morning of procedure.

## 2025-08-13 ENCOUNTER — CLINICAL SUPPORT (OUTPATIENT)
Dept: OPHTHALMOLOGY | Facility: CLINIC | Age: 35
End: 2025-08-13
Payer: COMMERCIAL

## 2025-08-13 DIAGNOSIS — H18.602 KERATOCONUS OF LEFT EYE: ICD-10-CM

## 2025-08-13 PROCEDURE — 99999 PR PBB SHADOW E&M-EST. PATIENT-LVL I: CPT | Mod: PBBFAC,,,

## 2025-08-13 PROCEDURE — 99499 UNLISTED E&M SERVICE: CPT | Mod: S$GLB,,, | Performed by: OPHTHALMOLOGY

## 2025-08-13 PROCEDURE — 0402T COLGN CRS-LINK CRN&PACHYMTRY: CPT | Mod: S$GLB,,, | Performed by: OPHTHALMOLOGY

## 2025-08-22 ENCOUNTER — PATIENT MESSAGE (OUTPATIENT)
Dept: OPTOMETRY | Facility: CLINIC | Age: 35
End: 2025-08-22
Payer: COMMERCIAL

## 2025-08-22 ENCOUNTER — OFFICE VISIT (OUTPATIENT)
Dept: OPHTHALMOLOGY | Facility: CLINIC | Age: 35
End: 2025-08-22
Payer: COMMERCIAL

## 2025-08-22 DIAGNOSIS — Z98.890 POST-OPERATIVE STATE: Primary | ICD-10-CM

## 2025-08-22 DIAGNOSIS — H18.609 KERATOCONUS, UNSPECIFIED LATERALITY: ICD-10-CM

## 2025-08-22 PROCEDURE — 99999 PR PBB SHADOW E&M-EST. PATIENT-LVL II: CPT | Mod: PBBFAC,,, | Performed by: OPHTHALMOLOGY

## 2025-08-22 RX ORDER — CIPROFLOXACIN HYDROCHLORIDE 3 MG/ML
1 SOLUTION/ DROPS OPHTHALMIC 4 TIMES DAILY
COMMUNITY

## 2025-08-22 RX ORDER — PREDNISOLONE ACETATE 10 MG/ML
1 SUSPENSION/ DROPS OPHTHALMIC 4 TIMES DAILY
COMMUNITY